# Patient Record
Sex: FEMALE | Race: WHITE | NOT HISPANIC OR LATINO | Employment: OTHER | URBAN - METROPOLITAN AREA
[De-identification: names, ages, dates, MRNs, and addresses within clinical notes are randomized per-mention and may not be internally consistent; named-entity substitution may affect disease eponyms.]

---

## 2019-03-25 ENCOUNTER — HISTORICAL (OUTPATIENT)
Dept: ADMINISTRATIVE | Facility: HOSPITAL | Age: 74
End: 2019-03-25

## 2019-03-25 LAB
BUN SERPL-MCNC: 18 MG/DL (ref 10–20)
CALCIUM SERPL-MCNC: 9 MG/DL (ref 8–10.5)
CHLORIDE SERPL-SCNC: 104 MMOL/L (ref 100–108)
CO2 SERPL-SCNC: 28 MMOL/L (ref 21–35)
CREAT SERPL-MCNC: 0.64 MG/DL (ref 0.7–1.3)
CREAT/UREA NIT SERPL: 28
D DIMER PPP IA.FEU-MCNC: 0.35 MG/L
GLUCOSE SERPL-MCNC: 125 MG/DL (ref 75–116)
POTASSIUM SERPL-SCNC: 3.3 MMOL/L (ref 3.6–5.2)
SODIUM SERPL-SCNC: 143 MMOL/L (ref 135–145)

## 2020-04-06 ENCOUNTER — HISTORICAL (OUTPATIENT)
Dept: ADMINISTRATIVE | Facility: HOSPITAL | Age: 75
End: 2020-04-06

## 2020-04-09 ENCOUNTER — HISTORICAL (OUTPATIENT)
Dept: ADMINISTRATIVE | Facility: HOSPITAL | Age: 75
End: 2020-04-09

## 2022-03-09 ENCOUNTER — HISTORICAL (OUTPATIENT)
Dept: PREADMISSION TESTING | Facility: HOSPITAL | Age: 77
End: 2022-03-09

## 2022-03-09 ENCOUNTER — HISTORICAL (OUTPATIENT)
Dept: ADMINISTRATIVE | Facility: HOSPITAL | Age: 77
End: 2022-03-09

## 2022-03-09 LAB
ABS NEUT (OLG): 2.45 (ref 2.1–9.2)
ALBUMIN SERPL-MCNC: 4.4 G/DL (ref 3.4–4.8)
ALBUMIN/GLOB SERPL: 1.6 {RATIO} (ref 1.1–2)
ALP SERPL-CCNC: 52 U/L (ref 40–150)
ALT SERPL-CCNC: 18 U/L (ref 0–55)
AST SERPL-CCNC: 18 U/L (ref 5–34)
BASOPHILS # BLD AUTO: 0 10*3/UL (ref 0–0.2)
BASOPHILS NFR BLD AUTO: 1 %
BILIRUB SERPL-MCNC: 0.9 MG/DL
BILIRUBIN DIRECT+TOT PNL SERPL-MCNC: 0.4 (ref 0–0.5)
BILIRUBIN DIRECT+TOT PNL SERPL-MCNC: 0.5 (ref 0–0.8)
BUN SERPL-MCNC: 13.5 MG/DL (ref 9.8–20.1)
CALCIUM SERPL-MCNC: 9.7 MG/DL (ref 8.7–10.5)
CHLORIDE SERPL-SCNC: 107 MMOL/L (ref 98–107)
CO2 SERPL-SCNC: 28 MMOL/L (ref 23–31)
CREAT SERPL-MCNC: 0.63 MG/DL (ref 0.55–1.02)
EOSINOPHIL # BLD AUTO: 0.1 10*3/UL (ref 0–0.9)
EOSINOPHIL NFR BLD AUTO: 2 %
ERYTHROCYTE [DISTWIDTH] IN BLOOD BY AUTOMATED COUNT: 14.8 % (ref 11.5–17)
GLOBULIN SER-MCNC: 2.8 G/DL (ref 2.4–3.5)
GLUCOSE SERPL-MCNC: 115 MG/DL (ref 82–115)
HCT VFR BLD AUTO: 43.2 % (ref 37–47)
HEMOLYSIS INTERF INDEX SERPL-ACNC: 1
HGB BLD-MCNC: 13.9 G/DL (ref 12–16)
ICTERIC INTERF INDEX SERPL-ACNC: 1
LIPEMIC INTERF INDEX SERPL-ACNC: <0
LYMPHOCYTES # BLD AUTO: 1.6 10*3/UL (ref 0.6–4.6)
LYMPHOCYTES NFR BLD AUTO: 35 %
MANUAL DIFF? (OHS): NO
MCH RBC QN AUTO: 28.3 PG (ref 27–31)
MCHC RBC AUTO-ENTMCNC: 32.2 G/DL (ref 33–36)
MCV RBC AUTO: 88 FL (ref 80–94)
MONOCYTES # BLD AUTO: 0.4 10*3/UL (ref 0.1–1.3)
MONOCYTES NFR BLD AUTO: 8 %
NEUTROPHILS # BLD AUTO: 2.45 10*3/UL (ref 2.1–9.2)
NEUTROPHILS NFR BLD AUTO: 54 %
PLATELET # BLD AUTO: 255 10*3/UL (ref 130–400)
PMV BLD AUTO: 10 FL (ref 9.4–12.4)
POTASSIUM SERPL-SCNC: 4.5 MMOL/L (ref 3.5–5.1)
PROT SERPL-MCNC: 7.2 G/DL (ref 5.8–7.6)
RBC # BLD AUTO: 4.91 10*6/UL (ref 4.2–5.4)
SODIUM SERPL-SCNC: 145 MMOL/L (ref 136–145)
WBC # SPEC AUTO: 4.5 10*3/UL (ref 4.5–11.5)

## 2022-03-21 ENCOUNTER — HISTORICAL (OUTPATIENT)
Dept: SURGERY | Facility: HOSPITAL | Age: 77
End: 2022-03-21

## 2022-03-21 LAB
CBG: 106 (ref 70–115)
CBG: 135 (ref 70–115)
CBG: 83 (ref 70–115)

## 2022-04-12 ENCOUNTER — HISTORICAL (OUTPATIENT)
Dept: RADIATION THERAPY | Facility: HOSPITAL | Age: 77
End: 2022-04-12

## 2022-05-01 PROBLEM — C50.412 MALIGNANT NEOPLASM OF UPPER-OUTER QUADRANT OF LEFT BREAST IN FEMALE, ESTROGEN RECEPTOR POSITIVE: Status: ACTIVE | Noted: 2022-05-01

## 2022-05-01 PROBLEM — Z17.0 MALIGNANT NEOPLASM OF UPPER-OUTER QUADRANT OF LEFT BREAST IN FEMALE, ESTROGEN RECEPTOR POSITIVE: Status: ACTIVE | Noted: 2022-05-01

## 2022-05-01 NOTE — PROGRESS NOTES
Subjective:       Patient ID: Nadja Monae is a 76 y.o. female.    Referring physician: Dr. Brandon Samano  Reason for Referral: Breast Cancer    Left Breast Cancer Stage IA ([id number])--Diagnosed 22  Biopsy/pathology:  Left breast biopsy done 22--2:00 4CMFN core biopsies positive for invasive ductal carcinoma, mucinous type, intermediate grade, measuring at least 0.7cm, DCIS, cribriform pattern, intermediate grade, w/o necrosis, %, WV 87%, Her2 1+ by IHC, negative by FISH, Ki67 5.3%.  Surgery/pathology: Left breast lumpectomy with SLN biopsy done 3/21/22--invasive mucinous carcinoma, well differentiated, Grade 1, measuring 0.9cm, clear margins, 4 negative SLNs.   Imagin. Screening Oscar MMG bilateral done at [facility] 22--an asymmetry anterior UOQ of left breast, measures 1.3cm, needs additional imaging, BIRADS 0.  2. Diagnostic MMG/US done at [facility] 2/15/22--lobulated mass in left breast at 2:00 4CMFN, measures 0.9X0.5X1cm, suspicious by US, focal asymmetry in UOQ of left breast does persist on MMG, BIRADS 4, biopsy recommeded.     Treatment history:  Left breast lumpectomy and SLN biopsy 3/21/22.    Current treatment plan:  1. Decision regarding radiation  2. Femara X 5 years    Chief Complaint: NPO    HPI   77 yo female found on screening MMG to have a focal asymmetry left breast UOQ. Diagnostic MMG/US showed asymmetry, lobulated mass at 2:00 left breast 4CMFN measuring 1cm, suspicious. Patient underwent biopsy and pathology showed IDCA, mucinous type, intermediate grade, measuring at least 0.7cm, strongly ER and WV positive and Her2 negative. Patient was referred to Dr. Brandon Samano. 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 She has been referred to me for recommendations for adjuvant treatment. Patient reports menarche age 12, OCPs for a few years, first pregnancy age 30, MARY and BSO in 30s,  +HRT 10+ years. FH first cousin with breast cancer in 60s, father with lung cancer. Patient presents for initial clinic visit with her . She is doing well since her surgery. She reports having a seroma after surgery that had to be drained but now is doing good. She denies any other problems. I discussed her low-risk breast cancer and excellent prognosis. She is meeting with radiation oncology next week.        Past Medical History:   Diagnosis Date    Anxiety       Review of patient's allergies indicates:  No Known Allergies   Current Outpatient Medications on File Prior to Visit   Medication Sig Dispense Refill    allopurinoL (ZYLOPRIM) 300 MG tablet Take 300 mg by mouth once daily.      EScitalopram oxalate (LEXAPRO) 20 MG tablet Take 20 mg by mouth once daily.      glimepiride (AMARYL) 4 MG tablet Take 4 mg by mouth once daily.      isosorbide mononitrate (IMDUR) 30 MG 24 hr tablet Take 30 mg by mouth once daily.      metFORMIN (GLUCOPHAGE) 1000 MG tablet Take 1,000 mg by mouth 2 (two) times daily.      pioglitazone (ACTOS) 30 MG tablet Take 30 mg by mouth once daily.      rosuvastatin (CRESTOR) 20 MG tablet Take 20 mg by mouth Daily.      VICTOZA 3-LINDSEY 0.6 mg/0.1 mL (18 mg/3 mL) PnIj pen Inject 3 mLs into the skin once daily.       No current facility-administered medications on file prior to visit.      Review of Systems   Constitutional: Negative for appetite change, fatigue, fever and unexpected weight change.   HENT: Negative for mouth sores.    Eyes: Negative.    Respiratory: Negative for cough and shortness of breath.    Cardiovascular: Negative for chest pain and leg swelling.   Gastrointestinal: Negative for abdominal distention, abdominal pain, constipation, diarrhea, nausea, vomiting and reflux.   Genitourinary: Negative for difficulty urinating, dysuria and hematuria.   Musculoskeletal: Negative for arthralgias and back pain.   Integumentary:  Negative for rash.        Seroma left  breast s/p lumpectomy, improved   Neurological: Negative for weakness and headaches.   Hematological: Negative for adenopathy.   Psychiatric/Behavioral: Negative for sleep disturbance. The patient is not nervous/anxious.               Physical Exam  Constitutional:       Appearance: Normal appearance.   HENT:      Head: Normocephalic.      Nose: Nose normal.      Mouth/Throat:      Mouth: Mucous membranes are moist.   Eyes:      Extraocular Movements: Extraocular movements intact.      Conjunctiva/sclera: Conjunctivae normal.   Cardiovascular:      Rate and Rhythm: Normal rate and regular rhythm.   Pulmonary:      Effort: Pulmonary effort is normal.      Breath sounds: Normal breath sounds.   Chest:   Breasts:      Right: Normal.        Comments: Left breast with incision outer quadrant healed well, left axillary incision healed, no palpable mass in either breast and no adenopathy  Abdominal:      General: Bowel sounds are normal. There is no distension.      Palpations: Abdomen is soft.      Tenderness: There is no abdominal tenderness.   Musculoskeletal:         General: Normal range of motion.   Skin:     General: Skin is warm.   Neurological:      General: No focal deficit present.      Mental Status: She is alert and oriented to person, place, and time.   Psychiatric:         Mood and Affect: Mood normal.         Judgment: Judgment normal.       ECOG SCORE    1 - Restricted in strenuous activity-ambulatory and able to carry out work of a light nature       No results for input(s): CBC in the last 72 hours.    Invalid input(s):  CMP,  BMP       Assessment:       Problem List Items Addressed This Visit        Oncology    Malignant neoplasm of upper-outer quadrant of left breast in female, estrogen receptor positive    Relevant Medications    letrozole (FEMARA) 2.5 mg Tab    Other Relevant Orders    Ambulatory referral/consult to [facility]    CBC Auto Differential    Comprehensive Metabolic Panel              Plan:    Patient with Stage IA breast cancer ([id number]) s/p lumpectomy done 3/21/22. Tumor measured 9mm, Grade 1, invasive mucinous carcinoma, margins clear with 4 negative SLNs, strongly ER and CO positive and Her2 negative, with low Ki67.  Due to favorable histology, per NCCN, no chemotherapy recommended.  I have recommended treatment with AI X 5 years.    Patient will meet with radiation oncology next week to make decision regarding radiation.  If radiation is planned, will start Femara after radiation.  If no radiation, start Femara now.    Femara prescription sent to pharmacy.  Schedule patient education.   Most common side effects discussed and patient given information from [email or web address] on Femara.    Obtain most recent bone density from [facility].    RTC 6 weeks for follow-up.     Will need follow-up MMG, will need to be sure this has been ordered by Dr. Samano after next visit.    I discussed patient's new diagnosis of breast cancer. Total time spent with patient discussing pathology, treatment plan and review of current NCCN guidelines >60minutes.   I discussed excellent prognosis given early stage and favorable tumor characteristics.       All questions answered at this time.    Dee Moss MD

## 2022-05-02 ENCOUNTER — TELEPHONE (OUTPATIENT)
Dept: HEMATOLOGY/ONCOLOGY | Facility: CLINIC | Age: 77
End: 2022-05-02
Payer: MEDICARE

## 2022-05-03 ENCOUNTER — OFFICE VISIT (OUTPATIENT)
Dept: HEMATOLOGY/ONCOLOGY | Facility: CLINIC | Age: 77
End: 2022-05-03
Payer: MEDICARE

## 2022-05-03 VITALS
TEMPERATURE: 98 F | SYSTOLIC BLOOD PRESSURE: 142 MMHG | RESPIRATION RATE: 14 BRPM | DIASTOLIC BLOOD PRESSURE: 82 MMHG | WEIGHT: 143.31 LBS | OXYGEN SATURATION: 99 % | HEART RATE: 75 BPM

## 2022-05-03 DIAGNOSIS — C50.412 MALIGNANT NEOPLASM OF UPPER-OUTER QUADRANT OF LEFT BREAST IN FEMALE, ESTROGEN RECEPTOR POSITIVE: ICD-10-CM

## 2022-05-03 DIAGNOSIS — Z17.0 MALIGNANT NEOPLASM OF UPPER-OUTER QUADRANT OF LEFT BREAST IN FEMALE, ESTROGEN RECEPTOR POSITIVE: ICD-10-CM

## 2022-05-03 PROCEDURE — 99999 PR PBB SHADOW E&M-EST. PATIENT-LVL III: ICD-10-PCS | Mod: PBBFAC,,, | Performed by: INTERNAL MEDICINE

## 2022-05-03 PROCEDURE — 99205 PR OFFICE/OUTPT VISIT, NEW, LEVL V, 60-74 MIN: ICD-10-PCS | Mod: S$GLB,,, | Performed by: INTERNAL MEDICINE

## 2022-05-03 PROCEDURE — 3079F PR MOST RECENT DIASTOLIC BLOOD PRESSURE 80-89 MM HG: ICD-10-PCS | Mod: CPTII,S$GLB,, | Performed by: INTERNAL MEDICINE

## 2022-05-03 PROCEDURE — 3077F PR MOST RECENT SYSTOLIC BLOOD PRESSURE >= 140 MM HG: ICD-10-PCS | Mod: CPTII,S$GLB,, | Performed by: INTERNAL MEDICINE

## 2022-05-03 PROCEDURE — 99205 OFFICE O/P NEW HI 60 MIN: CPT | Mod: S$GLB,,, | Performed by: INTERNAL MEDICINE

## 2022-05-03 PROCEDURE — 99999 PR PBB SHADOW E&M-EST. PATIENT-LVL III: CPT | Mod: PBBFAC,,, | Performed by: INTERNAL MEDICINE

## 2022-05-03 PROCEDURE — 3077F SYST BP >= 140 MM HG: CPT | Mod: CPTII,S$GLB,, | Performed by: INTERNAL MEDICINE

## 2022-05-03 PROCEDURE — 3079F DIAST BP 80-89 MM HG: CPT | Mod: CPTII,S$GLB,, | Performed by: INTERNAL MEDICINE

## 2022-05-03 RX ORDER — ALLOPURINOL 300 MG/1
300 TABLET ORAL DAILY
COMMUNITY
Start: 2022-04-25

## 2022-05-03 RX ORDER — LIRAGLUTIDE 6 MG/ML
3 INJECTION SUBCUTANEOUS DAILY
COMMUNITY
Start: 2022-04-26 | End: 2022-11-09

## 2022-05-03 RX ORDER — GLIMEPIRIDE 4 MG/1
4 TABLET ORAL DAILY
COMMUNITY
Start: 2022-04-25

## 2022-05-03 RX ORDER — ISOSORBIDE MONONITRATE 30 MG/1
15 TABLET, EXTENDED RELEASE ORAL DAILY
COMMUNITY
Start: 2022-03-14

## 2022-05-03 RX ORDER — ESCITALOPRAM OXALATE 20 MG/1
20 TABLET ORAL DAILY
COMMUNITY
Start: 2022-03-14 | End: 2023-11-27

## 2022-05-03 RX ORDER — ROSUVASTATIN CALCIUM 20 MG/1
5 TABLET, COATED ORAL DAILY
COMMUNITY
Start: 2022-04-25

## 2022-05-03 RX ORDER — LETROZOLE 2.5 MG/1
2.5 TABLET, FILM COATED ORAL DAILY
Qty: 30 TABLET | Refills: 2 | Status: SHIPPED | OUTPATIENT
Start: 2022-05-03 | End: 2022-08-01

## 2022-05-03 RX ORDER — METFORMIN HYDROCHLORIDE 1000 MG/1
1000 TABLET ORAL 2 TIMES DAILY
COMMUNITY
Start: 2022-04-23

## 2022-05-03 RX ORDER — PIOGLITAZONEHYDROCHLORIDE 30 MG/1
15 TABLET ORAL DAILY
COMMUNITY
Start: 2022-04-25

## 2022-05-03 NOTE — LETTER
May 3, 2022        Brandon Samano MD  [address]  [address]  [place] [address]             [facility]  [address], [address]  [place] [address]  Phone: [phone]   Patient: Nadja Monae   MR Number: [id number]   YOB: 1945   Date of Visit: 5/3/2022       Dear Dr. Samano:    Thank you for referring Nadja Monae to me for evaluation. Below are the relevant portions of my assessment and plan of care.    Problem List Items Addressed This Visit        Oncology    Malignant neoplasm of upper-outer quadrant of left breast in female, estrogen receptor positive    Relevant Medications    letrozole (FEMARA) 2.5 mg Tab    Other Relevant Orders    Ambulatory referral/consult to [facility]    CBC Auto Differential    Comprehensive Metabolic Panel           Patient with Stage IA breast cancer ([id number]) s/p lumpectomy done 3/21/22. Tumor measured 9mm, Grade 1, invasive mucinous carcinoma, margins clear with 4 negative SLNs, strongly ER and MO positive and Her2 negative, with low Ki67.  Due to favorable histology, per NCCN, no chemotherapy recommended.  I have recommended treatment with AI X 5 years.    Patient will meet with radiation oncology next week to make decision regarding radiation.  If radiation is planned, will start Femara after radiation.  If no radiation, start Femara now.    Femara prescription sent to pharmacy.  Schedule patient education.   Most common side effects discussed and patient given information from [facility] on Femara.    Obtain most recent bone density from [facility].    RTC 6 weeks for follow-up.     Will need follow-up MMG, will need to be sure this has been ordered by Dr. Samano after next visit.    I discussed patient's new diagnosis of breast cancer. Total time spent with patient discussing pathology, treatment plan and review of current NCCN guidelines >60minutes.   I discussed excellent prognosis given early stage  and favorable tumor characteristics.       All questions answered at this time.    Dee Moss MD                    If you have questions, please do not hesitate to call me. I look forward to following Nadja along with you.    Sincerely,      Dee Moss MD           CC  No Recipients

## 2022-05-09 PROCEDURE — 77290 THER RAD SIMULAJ FIELD CPLX: CPT | Performed by: RADIOLOGY

## 2022-05-09 PROCEDURE — 77334 RADIATION TREATMENT AID(S): CPT | Performed by: RADIOLOGY

## 2022-05-09 NOTE — PATIENT INSTRUCTIONS
Hormone Therapy for Breast Cancer  Key Points  The hormones estrogen and progesterone can stimulate the growth of some breast cancers. Hormone therapy is used to stop or slow the growth of these tumors.  Hormone therapy is used to treat both early and advanced breast cancer, and to prevent breast cancer in women who are at high risk of developing the disease.  Certain medications, especially antidepressants, may reduce the potency of the hormone therapy drug tamoxifen. People who are taking antidepressants should discuss this issue with their doctor.   What are hormones?  Hormones are substances that function as chemical messengers in the body. They affect the actions of cells and tissues at various locations in the body, often reaching their targets through the bloodstream.  The hormones estrogen and progesterone are produced by the ovaries in premenopausal women and by some other tissues, including fat and skin, in both premenopausal and postmenopausal women. Estrogen promotes the development and maintenance of female sex characteristics and the growth of long bones. Progesterone plays a role in the menstrual cycle and pregnancy.  Estrogen and progesterone can also promote the growth of some breast cancers, which are called hormone-sensitive (or hormone-dependent) breast cancers.  How do hormones stimulate the growth of breast cancer?  Hormone-sensitive breast cancer cells contain proteins known as hormone receptors that become activated when hormones bind to them. The activated receptors cause changes in the expression of specific genes, which can lead to the stimulation of cell growth.  To determine whether breast cancer cells contain hormone receptors, doctors test samples of tumor tissue that have been removed by surgery. If the tumor cells contain estrogen receptors, the cancer is called estrogen receptor-positive (ER-positive), estrogen-sensitive, or estrogen-responsive. Similarly, if the tumor cells contain  progesterone receptors, the cancer is called progesterone receptor-positive (NC- or PgR-positive). Approximately 70 percent of breast cancers are ER-positive. Most ER-positive breast cancers are also NC-positive (1).  Breast cancers that lack estrogen receptors are called estrogen receptor-negative (ER-negative). These tumors are estrogen-insensitive, meaning that they do not use estrogen to grow. Breast tumors that lack progesterone receptors are called progesterone receptor-negative (NC- or PgR-negative).  What is hormone therapy?  Hormone therapy (also called hormonal therapy, hormone treatment, or endocrine therapy) slows or stops the growth of hormone-sensitive tumors by blocking the body's ability to produce hormones or by interfering with hormone action. Tumors that are hormone-insensitive do not respond to hormone therapy.  Hormone therapy for breast cancer is not the same as menopausal hormone therapy or female hormone replacement therapy, in which hormones are given to reduce the symptoms of menopause.  What types of hormone therapy are used for breast cancer?  Several strategies have been developed to treat hormone-sensitive breast cancer, including the following:  Blocking ovarian function: Because the ovaries are the main source of estrogen in premenopausal women, estrogen levels in these women can be reduced by eliminating or suppressing ovarian function. Blocking ovarian function is called ovarian ablation.  Ovarian ablation can be done surgically in an operation to remove the ovaries (called oophorectomy) or by treatment with radiation. This type of ovarian ablation is usually permanent.  Alternatively, ovarian function can be suppressed temporarily by treatment with drugs called gonadotropin-releasing hormone (GnRH) agonists, which are also known as luteinizing hormone-releasing hormone (LH-RH) agonists. These medicines interfere with signals from the pituitary gland that stimulate the ovaries to  produce estrogen.  Examples of ovarian suppression drugs that have been approved by the U[place] Food and Drug Administration (FDA) are goserelin (Zoladex®) and leuprolide (Lupron®).  Blocking estrogen production: Drugs called aromatase inhibitors can be used to block the activity of an enzyme called aromatase, which the body uses to make estrogen in the ovaries and in other tissues. Aromatase inhibitors are used primarily in postmenopausal women because the ovaries in premenopausal women produce too much aromatase for the inhibitors to block effectively. However, these drugs can be used in premenopausal women if they are given together with a drug that suppresses ovarian function.  Examples of aromatase inhibitors approved by the FDA are anastrozole (Arimidex®) and letrozole (Femara®), both of which temporarily inactivate aromatase, and exemestane (Aromasin®), which permanently inactivates the enzyme.  Blocking estrogen's effects: Several types of drugs interfere with estrogen's ability to stimulate the growth of breast cancer cells:  Selective estrogen receptor modulators (SERMs) bind to estrogen receptors, preventing estrogen from binding. Examples of SERMs approved by the FDA are tamoxifen (Nolvadex®), raloxifene (Evista®), and toremifene (Fareston®). Tamoxifen has been used for more than 30 years to treat hormone receptor-positive breast cancer.    Because SERMs bind to estrogen receptors, they can potentially not only block estrogen activity (i.e., serve as estrogen antagonists) but also mimic estrogen effects (i.e., serve as estrogen agonists). Most SERMs behave as estrogen antagonists in some tissues and as estrogen agonists in other tissues. For example, tamoxifen blocks the effects of estrogen in breast tissue but acts like estrogen in the uterus and bone.  Other antiestrogen drugs, such as fulvestrant (Faslodex®), work in a somewhat different way to block estrogen's effects. Like SERMs, fulvestrant attaches  to the estrogen receptor and functions as an estrogen antagonist. However, unlike SERMs, fulvestrant has no estrogen agonist effects. It is a pure antiestrogen. In addition, when fulvestrant binds to the estrogen receptor, the receptor is targeted for destruction.  How is hormone therapy used to treat breast cancer?  There are three main ways that hormone therapy is used to treat hormone-sensitive breast cancer:  Adjuvant therapy for early-stage breast cancer: Research has shown that women treated for early-stage ER-positive breast cancer benefit from receiving at least 5 years of adjuvant hormone therapy (2). Adjuvant therapy is treatment given after the main treatment (surgery, in the case of early-stage breast cancer) to increase the likelihood of a cure.  Adjuvant therapy may include radiation therapy and some combination of chemotherapy, hormone therapy, and targeted therapy. Tamoxifen has been approved by the FDA for adjuvant hormone treatment of premenopausal and postmenopausal women (and men) with ER-positive early-stage breast cancer, and anastrozole and letrozole have been approved for this use in postmenopausal women.  A third aromatase inhibitor, exemestane, is approved for adjuvant treatment of early-stage breast cancer in postmenopausal women who have received tamoxifen previously.  Until recently, most women who received adjuvant hormone therapy to reduce the chance of a breast cancer recurrence took tamoxifen every day for 5 years. However, with the Taoism of newer hormone therapies, some of which have been compared with tamoxifen in clinical trials, additional approaches to hormone therapy have become common (3-5). For example, some women may take an aromatase inhibitor every day for 5 years, instead of tamoxifen. Other women may receive additional treatment with an aromatase inhibitor after 5 years of tamoxifen. Finally, some women may switch to an aromatase inhibitor after 2 or 3 years of  tamoxifen, for a total of 5 or more years of hormone therapy.  Decisions about the type and duration of adjuvant hormone therapy must be made on an individual basis. This complicated decision-making process is best carried out by talking with an oncologist, a doctor who specializes in cancer treatment.  Treatment of metastatic breast cancer: Several types of hormone therapy are approved to treat hormone-sensitive breast cancer that is metastatic (has spread to other parts of the body).  Studies have shown that tamoxifen is effective in treating women and men with metastatic breast cancer (6). Toremifene is also approved for this use. The antiestrogen fulvestrant can be used in postmenopausal women with metastatic ER-positive breast cancer after treatment with other antiestrogens (7).  The aromatase inhibitors anastrozole and letrozole can be given to postmenopausal women as initial therapy for metastatic hormone-sensitive breast cancer (8, 9). These two drugs, as well as the aromatase inhibitor exemestane, can also be used to treat postmenopausal women with advanced breast cancer whose disease has worsened after treatment with tamoxifen (10).  Neoadjuvant treatment of breast cancer: The use of hormone therapy to treat breast cancer before surgery (neoadjuvant therapy) has been studied in clinical trials (11). The goal of neoadjuvant therapy is to reduce the size of a breast tumor to allow breast-conserving surgery. Data from randomized controlled trials have shown that neoadjuvant hormone therapies--in particular, aromatase inhibitors--can be effective in reducing the size of breast tumors in postmenopausal women. The results in premenopausal women are less clear because only a few small trials involving relatively few premenopausal women have been conducted thus far.  No hormone therapy has yet been approved by the FDA for the neoadjuvant treatment of breast cancer.  Can hormone therapy be used to prevent breast  cancer?  Yes. Most early breast cancers are ER-positive, and clinical trials have studied whether hormone therapy can be used to prevent breast cancer in women who are at increased risk of getting the disease.  A large [employer]-sponsored randomized clinical trial called the Breast Cancer Prevention Trial found that tamoxifen, taken for 5 years, reduced the risk of developing invasive breast cancer by about 50 percent in postmenopausal women who were at increased risk of getting the disease (12). A subsequent large randomized trial, the Study of Tamoxifen and Raloxifene, which was also sponsored by [facility], found that 5 years of raloxifene reduces breast cancer risk in such women by about 38 percent (13).  As a result of these trials, both tamoxifen and raloxifene have been approved by the FDA to reduce the risk of developing breast cancer in women at high risk of the disease. Tamoxifen is approved for use regardless of menopausal status. Raloxifene is approved for use only in postmenopausal women.  The aromatase inhibitor exemestane has also been found to reduce the risk of breast cancer in postmenopausal women at increased risk of the disease. After 3 years of follow-up in another randomized trial, women who took exemestane were 65 percent less likely than those who took a placebo to develop breast cancer (14). Longer follow-up studies will be necessary to determine whether the risk reduction with exemestane remains high over time, as well as to understand any risks of exemestane treatment. Although exemestane has been approved by the FDA for treatment of women with ER-positive breast cancer, it has not been approved for breast cancer prevention.  What are the side effects of hormone therapy?  The side effects of hormone therapy depend largely on the specific drug or the type of treatment (5). The benefits and risks of taking hormone therapy should be carefully weighed for each woman.  Hot flashes, night sweats, and vaginal  dryness are common side effects of hormone therapy. Hormone therapy also disrupts the menstrual cycle in premenopausal women.  Less common but serious side effects of hormone therapy drugs are listed below.  Tamoxifen  Risk of blood clots, especially in the lungs and legs (12)  Stroke (15)  Cataracts (16)  Endometrial and uterine cancers (15, 17)  Bone loss in premenopausal women  Mood swings, depression, and loss of libido  In men: headaches, nausea, vomiting, skin rash, impotence, and decreased sexual interest  Raloxifene  Risk of blood clots, especially in the lungs and legs (12)  Stroke in certain subgroups (15)  Ovarian suppression  Bone loss  Mood swings, depression, and loss of libido  Aromatase inhibitors  Risk of heart attack, angina, heart failure, and hypercholesterolemia (18)  Bone loss  Joint pain (19-22)  Mood swings and depression  Fulvestrant  Gastrointestinal symptoms (23)  Loss of strength (23)  Pain  A common switching strategy, in which patients take tamoxifen for 2 or 3 years, followed by an aromatase inhibitor for 2 or 3 years, may yield the best balance of benefits and harms of these two types of hormone therapy (15).  Can other drugs interfere with hormone therapy?  Certain drugs, including several commonly prescribed antidepressants (those in the category called selective serotonin reuptake inhibitors, or SSRIs), inhibit an enzyme called CYP2D6. This enzyme plays a critical role in the use of tamoxifen by the body because it metabolizes, or breaks down, tamoxifen into molecules, or metabolites, that are much more active than tamoxifen itself.  The possibility that SSRIs might, by inhibiting CYP2D6, slow the metabolism of tamoxifen and reduce its potency is a concern given that as many as one-fourth of breast cancer patients experience clinical depression and may be treated with SSRIs. In addition, SSRIs are sometimes used to treat hot flashes caused by hormone therapy.  Researchers have  "found that women taking certain SSRIs together with tamoxifen have decreased blood levels of active tamoxifen metabolites. Because of this, many experts suggest that patients who are taking antidepressants along with tamoxifen should discuss treatment options with their doctors. For example, doctors may recommend switching from an SSRI that is a potent inhibitor of CYP2D6 (such as paroxetine) to one that is a weaker inhibitor (such as sertraline) or that has no inhibitory activity (such as venlafaxine or citalopram), or they may suggest that their postmenopausal patients take an aromatase inhibitor instead of tamoxifen.  Other medications that inhibit CYP2D6 include the following:  Quinidine, which is used to treat abnormal heart rhythms  Diphenhydramine, which is an antihistamine  Cimetidine, which is used to reduce stomach acid  People who are prescribed tamoxifen should discuss the use of all other medications with their doctors.  Where can someone find more information about drugs used in hormone therapy for breast cancer?  NCI's Drug Information Summaries provide consumer-friendly information about certain drugs that are approved by the FDA to treat cancer or conditions related to cancer. For each drug, topics covered include background information, research results, possible side effects, FDA approval information, and ongoing clinical trials. The Drug Information Summaries include information about drugs that have been approved for breast cancer.    Letrozole     (LET janel zole)  Trade Name: Femara®  Femara is the trade name for the generic drug letrozole.  In some cases, health care professionals may use the trade name  Femara ® when referring to the generic drug name letrozole.  Drug Type:  Letrozole is a hormone therapy. Letrozole is classified as an aromatase inhibitor.  (For more detail, see "How Letrozole Works" section below).  What Letrozole Is Used For:  Letrozole is used to treat breast cancer in " postmenopausal women.  Note: If a drug has been approved for one use, physicians may elect to use this same drug for other problems if they believe it may be helpful.  How Letrozole Is Given:  Letrozole is a pill, taken by mouth.  You should take Letrozole at about the same time each day.  You may take Letrozole with or without food. If you miss a dose, do not take a double dose the next day.  You should not stop taking Letrozole without discussing with your physician.  The amount of Letrozole that you will receive depends on many factors, including your general health or other health problems, and the type of cancer or condition being treated.  Your doctor will determine your dose and how long you will be taking Letrozole.  Side Effects:  Important things to remember about the side effects of Letrozole:  Most people do not experience all of the side effects listed.  Side effects are often predictable in terms of their onset and duration.   Side effects are almost always reversible and will go away after treatment is complete.  There are many options to help minimize or prevent side effects.  There is no relationship between the presence or severity of side effects and the effectiveness of the medication.  The following side effects are common (occurring in greater than 30%) for patients taking Letrozole:  Hot flashes  High cholesterol  These side effects are less common side effects (occurring in about 10-29%) of patients receiving Letrozole:  Arthralgias (bone and joint pain)  Night sweats  Weight gain  Nausea  Not all side effects are listed above. Some that are rare (occurring in less than 10% of patients) are not listed here.  However, you should always inform your health care provider if you experience any unusual symptoms.  When to contact your doctor or health care provider:  The following symptoms require medical attention, but are not an emergency.  Contact your health care provider within 24 hours of  noticing any of the following:  Symptoms of recurrent period (vaginal bleeding/spotting)  Always inform your health care provider if you experience any unusual symptoms.  Precautions:  Before starting Letrozole treatment, make sure you tell your doctor about any other medications you are taking (including prescription, over-the-counter, vitamins, herbal remedies, etc.).    Inform your health care professional if you are pregnant or may be pregnant prior to starting this treatment. Pregnancy category D (Letrozole may be hazardous to the fetus.  Women who are pregnant or become pregnant must be advised of the potential hazard to the fetus).  Letrozole is indicated for post-menopausal women. Do not conceive a child (get pregnant) while taking Letrozole. Barrier methods of contraception, such as condoms, are recommended. Discuss with your doctor when you may safely become pregnant or conceive a child after therapy.  Do not breast feed while taking Letrozole.  Self-Care Tips:  If you are experiencing hot flashes, wearing light clothing, staying in a cool environment, and putting cool cloths on your head may reduce symptoms. Consult your health care provider if these worsen, or become intolerable.  Acetaminophen or ibuprofen may help relieve discomfort from generalized aches and pains.  However, be sure to talk with your doctor before taking it.  Letrozole causes little nausea.  But if you should experience nausea, take anti-nausea medications as prescribed by your doctor, and eat small frequent meals.  Sucking on lozenges and chewing gum may also help.   Avoid sun exposure.  Wear SPF 15 (or higher) sunblock and protective clothing.  In general, drinking alcoholic beverages should be kept to a minimum or avoided completely.  You should discuss this with your doctor.  Get plenty of rest.   Maintain good nutrition.  If you experience symptoms or side effects, be sure to discuss them with your health care team.  They can  prescribe medications and/or offer other suggestions that are effective in managing such problems.  Monitoring and Testing:  You will be monitored regularly by your doctor while you are taking letrozole, but no special tests or blood tests are required.  How Letrozole Works:  Hormones are chemical substances that are produced by glands in the body, which enter the bloodstream and cause effects in other tissues.  For example, the hormone testosterone made in the testicles and is responsible for male characteristics such as deepening voice and increased body hair.  The use of hormone therapy to treat cancer is based on the observation that receptors for specific hormones that are needed for cell growth are on the surface of some tumor cells.  Hormone therapies work by stopping the production of a certain hormone, blocking hormone receptors, or substituting chemically similar agents for the active hormone, which cannot be used by the tumor cell.  The different types of hormone therapies are categorized by their function and/or the type of hormone that is affected.  Letrozole is an aromatase inhibitor.  This means it blocks the enzyme aromatase (found in the body's muscle, skin, breast and fat), which is used to convert androgens (hormones produced by the adrenal glands) into estrogen. In the absence of estrogen, tumors dependent on this hormone for growth will shrink.  Note:  We strongly encourage you to talk with your health care professional about your specific medical condition and treatments. The information contained in this website is meant to be helpful and educational, but is not a substitute for medical advice.      Your Summary  Your personalized [employer] Survivorship Care Plan gives you information about the health risks you may face as a  result of cancer therapies. Your level of risk can vary based on the doses and duration of your treatment and the  combination of treatments received.  You should discuss  this plan with your oncology team to better understand your personal risks. These results can be  concerning, but not every survivor experiences every side effect, and some survivors do not experience any long-term  effects. Learning about these risks can help you develop a plan with your healthcare provider to monitor for or reduce your  risk for these side effects through screening and a healthy lifestyle.  The information in your plan is broken down by:  Cancer therapies you received and related risks  Future screening recommendations  Healthy living tips  Psychosocial issues you may face  You may see a particular side effect in more than one section because more than one therapy can cause the same longterm  effect.  The information in this plan is based on the available research and literature concerning cancer survivors. This area is  continually growing, and as new information becomes available, it will be added to the program. You may want to redo  your plan periodically.  You received the following treatments for Breast Cancer  Lumpectomy  Arjay Node Biopsy  Letrozole (Femara®)  Coordinating Your Care  As a survivor, it is important that you keep a journal or notebook of your care. Include your doctor's contact information, a  list of past and current medications, therapies received, laboratory and radiology studies. (Visit the [employer] section on  [employer] for forms you can use to organize this material). While some survivors continue to see an oncologist, many return  to a primary care provider or internist for routine care, many of whom are uncertain how to care for you. Developing the  [employer] Survivorship Care Plan can help you and your primary care provider understand what issues to look for, and  how to handle them.  If you are being followed only by a primary care practitioner, it is a good idea to maintain a relationship with an oncologist  or late effects clinic, should you need any guidance  "or referrals with regards to late effects. Call the cancer center where  you were treated to ask if they have a survivor's clinic, or find one by searching [facility]'s Survivorship Clinic List (though  this list is not exhaustive). A survivorship clinic will review the therapies you received, discuss your risks with you, and act  as a consultant to your primary care team. Your [employer] Survivorship Care Plan includes a "Healthcare Provider  Summary" (found on the right side of your results), which is an abbreviated summary of recommendations that you can give  to healthcare providers for reference.  1/13  What's In Your Care Plan  Your Summary  Follow Up Care For Breast Cancer  Surgery Side Effects  [place] Node Biopsy  Lumpectomy  Risks Related to Medications  Risk of Developing Osteoporosis  Side Effects While Taking Aromatase Inhibitors  Sexuality Concerns for Female Survivors  What We Do Not Know  Healthy Living After Cancer  General Cancer Screening for Women after Breast Cancer  Follow Up Care For Breast Cancer  After receiving treatment for breast cancer, it is important for you to adhere to your providers plan for follow-up care.  General recommendations for follow-up care include:  Survivors who have had breast-conserving surgery (lumpectomy) or single mastectomy should have a mammogram  annually. In addition, breast MRI may be considered for survivors at high risk for cancer in the other breast.  Those who have had double mastectomy generally do not need mammograms, but you should examine your chest  wall for swelling or rash, and report any changes to your oncologist.  If you have had breast-conserving surgery (lumpectomy) and radiation, you should wait 6-12 months after radiation  before beginning annual mammograms.  Survivors should be seen by their oncologist 1-4 times per year for 5 years, and then annually after 5 years.  If you are taking tamoxifen and still have your uterus, you should be seen " once a year by a gynecologist or womens  health provider for an exam. Report any vaginal bleeding to your provider immediately, as this can be a sign of  uterine cancer.  Survivors taking an aromatase inhibitor or who go into menopause due to treatment should have their bone health  evaluated by a Dexa scan at baseline and then periodically thereafter as both of these can lead to loss of bone  strength.  Your team may recommend you take supplemental calcium and vitamin D.  Routine lab testing, CT scans, or bone scans to look for cancer spread (otherwise known as metastases) are not  recommended. Research has shown that if a woman does develop metastatic disease, the type of treatment,  response to treatment, and overall survival are equivalent, regardless of if it is found before symptoms develop. In  other words, outcomes are similar for those who are treated for metastases found on routine scans (with no symptoms  present) and women who are not treated until those metastases cause symptoms. Therefore, providers do not  routinely screen patients for metastatic disease unless they have developed symptoms.  Research has shown that leading an active lifestyle and maintaining a healthy weight, with a body mass index (BMI)  of 20-25, may result in a lower risk of recurrence. Weight-bearing exercises, such as walking, yoga, and dancing, can  also help maintain bone strength. Talk with your healthcare team about resources to get started (or back to) a healthy  lifestyle!  Report any new, unusual, and/or persistent symptoms to your care team.  Surgery Side Effects  2/13  [place] Node Biopsy  Lymphedema is swelling of an extremity (leg/arm), groin, or head/neck caused by damage to the lymphatic drainage  system. [place] node biopsy decreases the risk of developing lymphedema compared to removing all lymph nodes, but it  does not eliminate the risk. You have a higher risk of lymphedema if you had radiation therapy to the  area where lymph  nodes were removed. It is important for you to know about lymphedema and what you can do to reduce your risk.  Signs of lymphedema include any of the following in the limb/area where the lymph nodes were removed:  Full, tight or heavy feeling.  Skin changes (reddened, warm, cool, dry, hard, stiff).  Aching/discomfort.  Less movement/flexibility in nearby joints.  You may also notice that you are having trouble fitting into clothes, like the sleeve of a jacket or pant leg, or feel your socks  are too tight. You also may notice jewelry feels tight, even though you have not gained any weight.  Lymphedema can occur right after treatment, weeks, months, or even years later. If you develop swelling or any of the  above signs, contact your care team. It is best to get treatment for lymphedema swelling as soon as you start having it.  Reducing the demand on your remaining lymph system can reduce your risk for lymphedema and/or control lymphedema.  Do this by:  Avoid infections, burns, cuts, excessive hot/cold, or injury to the part of the body at risk.  Avoid insect bites by using insect repellent.  Use lotion to prevent dry, chapped skin. Keep skin clean.  Use sunscreen with SPF 15 or higher and try to avoid the sun during the hottest time of day.  Avoid tight-fitting clothes and jewelry.  Whenever possible, have blood drawn, IVs placed, shots/vaccinations given, and blood pressure taken in the  unaffected arm/leg.  For more information on what you can do to reduce risk, refer to the article Understanding and Decreasing  Lymphedema Risk on the [employer] website.  If you want to start an exercise plan, talk with your care team and work with a physical and/or lymphedema therapist to  create a workout plan appropriate for you. Maintaining an optimal weight through a healthy diet and exercise can reduce  your risk of lymphedema. Gradually build up the amount and intensity of your exercise and monitor the  at-risk areas for any  changes or swelling.  In some cases, surgery to remove the lymph nodes can result in injury to the nerves in that area. Nerve damage can cause  pain, numbness, tingling, decreased sensation or strength in the area or limb. These symptoms could become worse over  time if scar tissue caused by radiation therapy develops. This can happen many years after the treatment. This type of pain  is called nerve pain and is treated with different medications than other types of pain. Speak with your care team if you are  having this type of pain.  ? Key Takeaways  Report any signs of swelling in the arm/hand on the side of your surgery.  You should be seen by a certified lymphedema therapist if you develop any signs of swelling.  Follow the precautions given to you to reduce your risk of developing or worsening lymphedema.  Lumpectomy  A lumpectomy, sometimes called breast-conserving surgery, is the removal of only the breast mass (lump) and a  surrounding area of normal tissue. After a lumpectomy, your breast may look different. Plastic surgery may be used to  correct this issue.  3/13  There is a risk of nerve damage during breast surgery. This can lead to pain in the chest wall and/or pain and tingling in the  arm/hand on the side of the surgery. This pain can worsen if scar tissue forms from the surgery or radiation therapy to the  area. Scar tissue can develop years after therapy. This type of pain is called neuropathic (nerve) pain and is often  described as burning or electric. It can also include numbness, tingling and decreased strength or sensation in the area.  Talk to your care team if you are experiencing pain.  You may also have lymph nodes removed during surgery. The removal of lymph nodes increases the risk of developing  lymphedema. Lymphedema is swelling of an extremity (underarm, arm, hand) caused by damage to the lymphatic drainage  system. Signs of lymphedema include swelling  (clothing or jewelry feels tight), feeling of fullness or tightness, aching pain,  and limited range of motion. Contact your care provider at the first sign of lymphedema so that treatment can be started.  Patients who have had a lymph node dissection can have difficulty moving the arm and/or shoulder. This can be temporary  but can also become permanent. A physical therapist can recommend exercises to promote shoulder mobility.  If lymph nodes are removed during lumpectomy, there is also risk for axillary web syndrome (OBI). This is also known as  cording. It happens when a fibrotic band or rope/cord-like texture develops under the skin. You can usually feel these  rope/cord-like areas under your skin. It can cause difficulty raising your arm above your shoulder or doing overhead  activities. Ask your provider for a referral for physical therapy to help you regain function.  ? Key Takeaways  Report any signs of swelling in the arm/hand on the side of your surgery. You should be seen by a certified  lymphedema therapist if you develop any signs of swelling.  You will have a mammogram 6-12 months after treatment is done and then annually.  Report any pain or changes in the breast or chest wall to your healthcare provider.  Ask to work with a physical therapist if you are having trouble moving your shoulder or arm.  Risks Related to Medications  Risk of Developing Osteoporosis  Osteoporosis is a decrease in bone density (thinning of the bones) that can lead to fractures (broken bones). While this  information is shown under the medication or surgery tab, the risk can be related to a few things. The risk is increased for:  People who take corticosteroids for more than 2 months (dexamethasone or prednisone, more than 5mg per day).  People who receive radiation to weight-bearing bones (spine, hips, legs).  Women who develop premature (early) menopause or have their ovaries removed before menopause.  Women who take  aromatase inhibitors (anastrozole, letrozole, and exemestane).  Pre-menopausal women who take tamoxifen.  Men who receive hormone therapy for prostate cancer or have a testicle(s) removed (orchiectomy).  People who have had a gastrectomy (removal of the stomach).  People who have had bone marrow/stem cell transplants.  Several types of chemotherapy have been found to contribute to bone loss, including Cytoxan, cisplatin, carboplatin,  ifosfamide, doxorubicin, methotrexate, and possibly imatinib.  Other factors that increase the risk of osteoporosis include being over the age of 50, smoking, drinking too much  alcohol, not exercising, some chronic health conditions, and a family history of osteoporosis.  Lowering Your Risk  There are things you can do to lower your risk of a fracture and strengthen your bones. You can learn more from the  National Osteoporosis Foundation (www.nof.org).  Get 1000-1200mg a day of calcium.  4/13  It is best to get calcium in a balanced diet, including 4-8 servings of calcium-rich foods a day. Examples of  calcium-rich foods are low-fat milk, yogurt, cheese, green leafy vegetables, nuts, seeds, beans, legumes, and  calcium fortified foods and juices.  A dietician can provide more guidance in choosing calcium-rich foods. A good resource is www.myplate.gov.  If you cannot get the recommended amount of calcium in foods, take calcium supplements.  Your body does not absorb calcium supplements well (in food or supplements), so spread your foods or  supplements out over a few times a day, instead of all at once.  Most calcium supplements are better absorbed when taken with food. Calcium citrate is the one exception and  can be taken with or without food.  If you take Synthroid / levothyroxine (thyroid hormone), separate it from calcium doses by at least 4 hours.  Get 400-1000 IU of vitamin D-3 or D-2 daily.  Your healthcare provider may check blood levels of vitamin D with the 25-OH Vitamin D  "blood test. This level  combined with a look at how much vitamin D you get in your diet can help guide how much vitamin D you  should take.  Do not take more than 2000 IU of vitamin D3 a day, unless directed by your healthcare provider. If you take  calcium or a multivitamin, be sure to check if it also contains vitamin D and subtract this amount from what you  need to take.  You can take vitamin D supplements with or without food.  Weight-bearing exercise and strength training can improve your bone strength. It can also help with other symptoms  such as fatigue and can reduce your risk of heart disease and diabetes.  Before you start an exercise program, check first with your care team to determine if it is safe.  Start with what is comfortable for you and work up to the following recommendations:  Get 30-40 minutes of weight-bearing exercise 3 times a week. Weight-bearing exercises are those in which  your feet or legs bear your bodys weight while the bones and muscles work against gravity. Examples include  walking, jogging, Zaheer Chi, yoga, and dancing.  Strength training 2-3 times per week, with a day of rest in between each session. Complete 8-12 repetitions of  the exercises below and repeat them two times (2 sets). Choose a weight where the tenth repetition is hard for  you to complete and you cant complete an 11 time. Now remove 1-5 pounds from that "maximum weight,"  and use that as your training weight. When you can easily do 12 repetitions, try to increase the weight by 3-  5lbs.  Exercises include leg extensions, calf raises, leg curls, chest press, latissimus pulldown, overhead press,  row machine, and curl-ups.  Your provider can refer you to a physical therapist or a physical medicine and rehabilitation physician for more  guidance regarding bone-strengthening exercises.  Decrease or stop caffeine intake and limit alcohol intake, as these can weaken bones.  Quit smoking. Talk with your provider about " how to get started. Learn more on [employer].  Your provider may order a DEXA scan, which is a test used to measure the thickness (density) of your bones.  ? Key Takeaways  Avoid smoking, caffeine, and excessive alcohol intake.  Perform weight-bearing and strength training exercises 2-3 times per week.  Calcium intake of 1000-1200mg per day plus Vitamin D 800iu to 1000iu per day (ideally from food sources, and  supplements when your diet is not sufficient).  Consider screening with DEXA scan.  Side Effects While Taking Aromatase Inhibitors  Your [facility] Care Plan focuses on late effects of therapy or those that can occur months to years after completing  therapy. Given that hormone therapy regimens can last anywhere from 5 to 10 years, we felt it was important to include  some information about the side effects you may experience while taking these medicines.  th 5/13  Aromatase Inhibitors (AIs) commonly cause hot flashes and other symptoms of menopause, such as night sweats, vaginal  dryness and forgetfulness. Some helpful tips include:  Avoiding triggers such as warm rooms, spicy, caffeinated, or alcohol-containing foods or beverages can help reduce  hot flashes.  Drink plenty of fluids, wear breathable clothing in layers, and exercise regularly.  For some women, certain antidepressant medications can provide relief of hot flashes.  Vaginal dryness can make intercourse uncomfortable. Vaginal moisturizers (Replens) used several times a week  and lubricants during sexual activity can be helpful. If these do not help, talk with your provider as you may be able to  use a low-dose vaginal estrogen product.  About 60% of women taking AIs experience aching or pain in their muscles, joints or bones, also known as arthralgias. In  some cases, this side effect is troubling enough for them to stop therapy. This pain is most common in the hands, wrists,  and knees but can occur in any joints. Some women experience pain that  comes and goes, but for others it is constant.  Some women say the stiffness and pain are worse in the morning. The pain tends to get worse over time up until a year  and then levels off and can improve.  Brisk walking and other moderate-intensity exercises can help significantly reduce muscle and joint pains. That can be  difficult when you have pain. Seeing a rehabilitation physician, or physical or occupational therapist can help you get  started exercising or to help you manage the joint pain with stretching, strengthening, and bracing.  Nonsteroidal anti-inflammatory medicines (NSAIDs, ibuprofen, naproxen) can be used to reduce swelling in the joints,  reducing discomfort. Other medications used to treat pain include acetaminophen and duloxetine (Cymbalta). Applying  heat or wearing a brace can be helpful. Acupuncture can also be helpful.  In some cases, switching to a different AI medicine can result in less discomfort. Most importantly, talk with your oncology  team if you are experiencing side effects. When side effects are well managed, you are more likely to stick with the  treatment regimen and ultimately have the benefits it can offer.  ? Key Takeaways  You may experience hot flashes and other symptoms of menopause, as well as, aching in your muscles, joints  or bones. Let your healthcare provider know if these symptoms become troublesome.  Discuss side effects with your oncology team, as many of these are manageable. Optimal therapy can last 5-10  years, so management of side effects is critical to helping you stay on therapy.  Sexuality Concerns for Female Survivors  Women of any age can have sexual concerns after cancer treatment. Do not hesitate to talk with your oncology team.  Sexuality concerns are common. For many women, cancer treatment can cause the sudden onset of menopause. This  sudden change in hormone levels in your body causes changes such as thinning and inflammation of the vaginal  "walls,  loss of tissue elasticity, decreased vaginal lubrication, and this vaginal dryness can cause painful intercourse. You may  experience hot flashes, mood swings, fatigue, and irritability. You may notice you have less desire for sex and difficulty  achieving orgasm.  Vaginal dryness causing painful intercourse is a common concern for survivors. Some tips for managing this issue include:  Use of vaginal moisturizer (Replens is the brand name) a few times a week.  Use lubricants during sexual activity (water or silicone-based). There are many brands and generic options available  at your local pharmacy.  Vaginal estrogen therapy (suppository, cream, or ring used in the vagina) can be helpful. If you had a hormonedependent  cancer, you should discuss current research on using estrogen-based therapy with your healthcare team.  Surgery and/or radiation therapy can result in scarring that can make intercourse uncomfortable. Open  communication about position changes and alternative methods of expressing affection with your partner can help  when resuming sexual activity after treatment.  6/13  If your sex life has been on hold during treatment and you are looking to rekindle your romance or start a new one, take  steps to feel more at ease. Have a "date" with your partner to build the romance. Talk about what feels good and what  doesnt.  Concerns about changes in your body, cancer recurrence, the stress and anxiety caused by cancer therapy, or changes in  your relationship with your partner can all affect how you feel about your sexuality. It is important to understand that sexual  activity cannot cause cancer to recur, nor can you spread cancer to another person through sexual activity. If you find that  your feelings are significantly impacting your sexuality, you should talk with your healthcare team about finding a therapist  experienced in helping cancer survivors.  Of utmost importance in addressing sexual " concerns is communication, both between partners and between survivors and  their healthcare team. Understand that these concerns are common, and communication is the first step to finding the right  solutions. Visit [facility] section on sexuality for more information.  ? Key Takeaways  Many anti-cancer medicines are associated with vaginal dryness, painful intercourse, reduced sexual desire,  and the ability to achieve orgasm. Many of these issues are caused by the sudden onset of menopause, which  can occur with cancer therapy. [facility] article on Vaginal Dryness and Painful [place] provides product  suggestions and tips.  In addition, you may experience other symptoms of menopause, such as hot flashes, mood swings, fatigue, and  irritability. Research has found that exercise, yoga, and acupuncture can help lessen menopausal symptoms.  Talk to your healthcare team about tips to manage these issues.  Open communication with your healthcare team and partner is essential for regaining your sexuality and  resolving issues. You may also consider talking with a therapist experienced in working with cancer survivors.  What We Do Not Know  There are many medications that we do not know the long-term effects they may cause. Most cancers are treated with a  combination of medicines. This can make it difficult to be certain which medication is the cause of a long-term effect.  Research is continuing and new information may become available. You should periodically talk to your healthcare team  about new information or look for new information on reliable internet sites. You can also create a new [employer] care plan  every few years to see if there is any new information included.  In addition, there have been many new therapies developed in recent years, including many of the biologic therapies,  monoclonal antibodies and targeted therapies. We may not know the long-term effects of these medications for many  years. As  a survivor, you should be an active participant in your healthcare and keep an ear out for new information.  ? Key Takeaways  Many cancer treatments today have not been available long enough to know what issues they may cause in  the years after treatment.  Always let your healthcare team know if you notice any new or worsening symptoms. Remember, you know  your body best.  Periodically look for new information about your treatment and talk to your healthcare team to see if they have  anything new to report.  Healthy Living After Cancer  Survivors often wonder what steps they can take to live healthier after cancer. There is no supplement or specific food you  can eat to assure good health, but there are things you can do to live healthier, prevent other diseases, and detect any  7/13  subsequent cancers early.  In addition to medical problems and screening, cancer survivors also sometimes have issues with insurance, employment,  relationships, sexual functioning, fertility, and emotional issues because of their treatment and we will discuss those in this  care plan.  No matter what, it is important to have a plan for who will provide your cancer-focused follow up care (an oncologist,  survivorship doctor or primary care doctor). You have taken the first step by developing a survivorship plan of care. If you  would like to find a survivorship doctor to review your care plan you can contact cancer centers in your area to see if they  have a survivor's clinic or search for a clinic on [facility]'s survivorship clinic list.  General Cancer Screening for Women after Breast Cancer  Cancer screening tests are designed to find cancer or pre-cancerous areas before there are any symptoms and, generally,  when treatments are most successful. (Learn more about screening tests) Various organizations have developed  guidelines for cancer screening for women. While these guidelines vary slightly between different organizations,  they cover  the same basic screening tests for breast, cervical and colorectal cancers, and are recommended to begin as early as the  late teens.  In addition, during routine health exams (at any age) your healthcare provider may also evaluate for cancers of the skin,  mouth and thyroid. Not all screening tests are right for everyone. Your personal and family cancer history, and/or the  presence of a known genetic predisposition, can affect which tests are right for you, and at what age you begin them.  Therefore, you should discuss these with your healthcare provider. Your care plan will also include a section on follow up  care for your type of cancer, and these recommendations override the general screening recommendations for that  particular type of cancer in the general population.  The American Cancer Society (ACS) recommends these screening guidelines for women:  Cervical Cancer Screening  All women should begin cervical cancer screening at age 25 using the following methods:  Women between the ages of 25 and 65 should have:  Primary HPV testing every 5 years. This test is not yet available at many centers/practices.  If this test is not available, you should be screened with co-testing, which is a combination of an HPV and Pap  test. This should be done every 5 years.  If HPV testing is not available, then a Pap test alone should be performed every three years.  Women over age 65 who have had regular cervical screenings that were normal should not be screened for cervical  cancer.  Women who have been diagnosed with cervical pre-cancer should continue to be screened until they meet one  of the following criteria over the previous 10 years:  Two negative, consecutive HPV tests.  Or 2 negative, consecutive co-tests.  Or 3 negative, consecutive pap tests in the last 3-5 years.  Women who have had their uterus and cervix removed in a hysterectomy and have no history of cervical cancer or  pre-cancer should not  be screened.  Women who have had the HPV vaccine should still follow the screening recommendations for their age group.  While the ACS does not recommend cervical cancer screening every year, women should still see their provider for a  well-woman checkup.  Some women, because of their history, may need to have a different screening schedule for cervical cancer. Please see  the ACS document, Cervical Cancer Screening Guidelines for more information.  Endometrial (Uterine) Cancer Screening  The ACS recommends that at the time of menopause, all women should be informed about the risks and symptoms of  endometrial cancer. Women should report any unexpected bleeding or spotting to their doctors. Some women, because of  8/13  their history, may want to consider having a yearly endometrial biopsy. Please talk with your doctor about your past  medical and gynecologic history to determine whether you are at increased risk for endometrial cancer.  For More Information:  Visit the American Cancer Society to learn more about general cancer screening recommendations.  Colon and Rectal Cancer Screening  Most men and women over the age of 45-50 should undergo routine screening for colon and rectal cancer, up until age 75.  The American Cancer Society suggests starting screening at age 45, whereas the [place] Preventive Services Task  Force suggests starting at age 50. Insurance may not cover screening before the age of 50 so you should talk with your  provider and insurance company before screening. Testing may be appropriate for younger people with a high-risk  personal or family health history.  Options for colon cancer screening can be divided into those that screen for both cancer and polyps, and those that just  screen for cancer. Tests that screen for cancer and polyps include flexible sigmoidoscopy, colonoscopy, double-contrast  barium enema, or CT colonography (virtual colonoscopy). Tests that screen mainly for  cancer include stool testing for  blood, or stool DNA testing. Learn more about colorectal cancer screening options.  The preferred screening recommended by the American College of Gastroenterologists is a colonoscopy every 10 years.  The ACS recommends screening beginning at age 45 (unless you are considered high risk, see below), using one of the  following testing schedules:  Tests that find polyps and cancer:  (Preferred over those that find cancer alone. If any of these tests are positive, a colonoscopy should be done.)  Flexible sigmoidoscopy every 5 years, or  Colonoscopy every 10 years, or  Double-contrast barium enema every 5 years, or  CT colonography (virtual colonoscopy) every 5 years  Tests that primarily test for cancer:  Yearly fecal occult blood test (FOBT)*, or  Yearly fecal immunochemical test (FIT) *, or  Stool DNA test (sDNA), interval uncertain*  * The multiple stool take-home test should be used. One test done by the doctor in the office is not adequate for testing. A  colonoscopy should be done if the test is positive.  Talk with your doctor about your medical history, and what colorectal cancer screening test and schedule is best for you.  For more information on colorectal cancer from the ACS, read Colorectal Cancer: Early Detection.  Individuals at higher risk of colon cancer should have screening earlier and potentially more frequently. Individuals at  higher risk of colon and rectal cancer include:  Individuals with a family history of colon or rectal cancer in a relative who was diagnosed before the age of 60.  Individuals with a history of polyps.  Individuals with inflammatory bowel disease (Crohns disease or ulcerative colitis).  Individuals with a genetic predisposition to colon or rectal cancer, such as hereditary non-polyposis colon cancer  (HNPCC) syndrome or familial adenomatous polyposis (FAP) syndrome.  For more detailed information regarding screening for individuals at  higher risk for colon cancer, see the ACS guidelines  for screening for high-risk individuals.  Lung Cancer Screening  The American Cancer Society does not recommend tests to check for lung cancer in people who are at average risk.    However, they do have screening guidelines for those who are at high risk of lung cancer due to cigarette smoking.  Screening might be right for you if you meet the followin to 80 years of age and in fairly good health.  Have at least a 20 pack-year smoking history AND are either still smoking or have quit smoking within the last 15  years (A pack-year is the number of cigarette packs smoked each day multiplied by the number of years a person has  smoked. Someone who smoked a pack of cigarettes per day for 20 years has a 20 pack-year smoking history, as  does someone who smoked 2 packs a day for 10 years.)  Screening is done with a low-dose CT scan (LDCT) of the chest. If you fit the list above, you and your provider should talk  about whether to have screening.  Sun Exposure and Skin Cancer Risk  Skin cancer is the most commonly diagnosed type of cancer, and rates are on the rise. However, this is one cancer that in  most cases can be prevented or detected early. While you may hear that you need the sun to make vitamin D, in reality you  only need a few minutes a day to do this. Exposure to ultraviolet (UV) rays, either by natural sunlight or tanning beds, can  lead to skin cancer. In addition, UV rays lead to other forms of skin damage, including wrinkles, loss of skin elasticity, dark  patches (sometimes called age spots or liver spots), and pre-cancerous skin changes (such as dry, scaly, rough patches).  Although dark-skinned people are less likely to develop skin cancer, they can and do develop skin cancers, most often in  areas that are not exposed to the sun (on the soles of the feet, under nails, and genitals).  You can do a lot to protect yourself from damaging UV  rays and to detect skin cancer early. Start by practicing sun safety,  including using a broad-spectrum sunscreen (which protects against UVA & UVB rays) every day, avoiding peak sun times  (10 am-4 pm when the rays are strongest) and wearing protective clothing such as hats, sunglasses and long-sleeved  shirts.  Examine your skin regularly so you become familiar with any moles or birthmarks. If a mole has changed in any way, you  should have a healthcare provider examine the area. This includes a change in size, shape, or color, the development of  scaliness, bleeding, oozing, itchiness, or pain, or if you develop a sore that will not heal. If you have a lot of moles, it may  be helpful to make note of moles using photographs or a mole map. The American Academy of Dermatology has a  helpful guide to performing a skin exam.  Learn more about the types of skin cancer on OncoLink and the Skin Cancer Foundation.  Healthy Lifestyle  For some cancer survivors, the experience is the impetus to making healthy lifestyle changes. It may seem insignificant,  but these changes have been shown to reduce the risk of the cancer coming back or a new cancer developing. Below are  some tips on adopting a healthier lifestyle.  Do not use tobacco in any form. If you do, learn more on OncoLink and talk to your healthcare provider about taking  steps to quit.  Maintain a healthy weight. Many studies have found that excess weight plays an important role in cancer  development and recurrence. While maintaining a healthy weight is important in cancer prevention, it cannot easily  be  from the importance of physical activity and eating a healthy diet. Strive to incorporate all three pieces  of the puzzle: healthy weight, balanced diet, and regular exercise.  Talk to your healthcare team about what a healthy weight is for you, and take steps to reach and maintain that  weight.  Experts recommend at least 30 minutes of moderate to  vigorous activity per day, 5 days a week.  Eat healthy, including plenty of fruits and vegetables daily. Strive to have 2/3 of your plate be vegetables, fruits,  whole grains, and beans, while 1/3 or less should be an animal product. Choose fish and chicken and limit red  meat and processed meats.  Learn more about recommendations for diet, activity, and weight in the AICRs Guidelines for Survivors and the  ACS Eat Healthy and Get Active information on their website.  Learn more about the benefits of physical activity from [facility].  10/13  Limit how much alcohol you drink (if you drink at all).  Have regular check-ups by a healthcare professional.  Keep up-to-date on general health screening tests, including cholesterol, blood pressure, and glucose (blood sugar)  levels.  Learn about healthy screening tests for women and men from the [place] Department of Health and Human  Services.  Get an annual influenza vaccine (flu shot).  Get vaccinated with the pneumococcal vaccine, which prevents a type of pneumonia, and re-vaccinated as  determined by your healthcare team. Learn more about adult vaccinations from the [facility].  Dont forget dental and eye health!  The American Optometric Association recommends adults have their eyes examined every 2 years until age  60, then annually. People who wear glasses or corrective lenses or are at high risk for eye problems (i.e.  diabetics, family history of eye disease) should be seen more frequently.  The American Dental Association recommends adults see their dentist at least once a year.  Life After Cancer  While many patients are told about the long-term health effects of treatment before starting, they often don't recall or didn't  concern themselves with them at that time. This is understandable. When you're presented with treatment options to save  your life, thinking about what could happen ten or twenty years down the road isn't as much of a priority.  So, what  "to do now? You should learn what your risks are based on the treatment you received, learn what you can do to  reduce these risks, if possible, and learn what to watch for. You have taken the first step by developing a survivorship care  plan. Some survivors may also benefit from a visit to a survivorship clinic. These clinics review your treatment history and  develop recommendations for you and your primary care team based on your risks. Contact cancer centers in your area to  see if they have a survivor's clinic or search for a clinic on [facility]'s survivorship clinic list (though this list is not  exhaustive).  After active treatment is complete, you will move into follow-up mode. This usually means you will see your oncology team  less often. For many, this can be a very scary time. The weekly or monthly visits to the oncologist are reassuring. Someone  is checking on things and giving you the thumbs up. Survivors are often surprised by their emotions at this time. You may  anticipate jumping for christiana and throwing a party. Instead, you may find yourself crying in the parking lot after your last  treatment, feeling vulnerable in unexpected ways.  You may find it worrisome that you are no longer getting active treatment. You may miss the much-needed daily or weekly  support you got from the oncology team. You may experience fear, sadness, anger, isolation, and grief. This is normal! You  may also feel a sense of relief, gratitude, and be proud of yourself for getting through treatment - as you should!  The end of therapy is a time when friends and family may say, "Congratulations" and "You must be glad to be done. But  you may be feeling uncertain about this milestone. Friends, family, and even the oncology team can be surprised by the  complicated emotions you are experiencing. They may not realize that these emotions are common and even expected,  which may make you feel even more isolated. You aren't alone. " "These are common reactions and the information in your  care plan will help you transition to survivorship.  For starters, be assured that your oncology team is always there if you have any concerns. Plans for follow-up care have  been developed to follow each person in the best way. This plan varies for every type of cancer and may involve periodic  blood work, radiology scans and tests, and physical exams. You may only see the oncology team once or twice a year, but  they are always a phone call away.  Coping with your emotions after cancer  Any time of transition in your life can be stressful. Give yourself time to adjust to this new place. You may hear from others,  it must be nice to be getting back to normal. But as any cancer survivor will tell you, things have changed. So has your  definition of "normal". Many survivors say they look at life differently. They don't take things for granted and don't sweat the  small stuff.  11/13  A cancer diagnosis changes you as a person, something people around you may not fully understand. It may be helpful to  join a group of survivors, either formally (in a support group) or informally (gather with few folks you have met along the  way). Email and the Internet have created wonderful support for all sorts of concerns, and survivorship is no different. There  are also professional resources available to help.  [facility] provides online and telephone support groups.  The Cancer Support Community has a number of locations across the [place] that provide education, support, and  networking to cancer survivors and their families. Their Helpline can also help you find other survivorship resources.  Many cancer centers and cancer service organizations offer support groups for you to talk about concerns during and  after treatment. A gal program can match you with someone whos been in your shoes and can be a listening ear  and support person - or you can become a gal to " someone else. No one understands this time better than  someone who has been there, and this support can be very valuable. Peer support/Ken programs are offered  through many organizations including [employer], [facility], [facility], and  the Leukemia and Lymphoma Society.  For many, a cancer diagnosis and treatment is a traumatic experience. Recovering from this trauma can be complicated by  the late side effects of treatment. These side effects can impact your quality of life long after you have finished your cancer  treatment. Some survivors experience symptoms of post-traumatic stress disorder. This can include sleep problems,  nightmares, flashbacks, avoiding places associated with the experience (like your treatment center), feeling hopeless,  anger, and guilt (including survivor's guilt), loss of interest in things you usually enjoy, and substance use.  It is important to find ways to cope with the trauma of cancer and your experiences. If you are having symptoms of posttraumatic  stress, ask your care team for a referral for counseling. Your oncology social worker can help navigate your  insurance and find an experienced provider who can give you support and therapy. Your team can also refer you for  psychiatry support if medication treatment may be helpful in treating your post-traumatic symptoms.  However, out of trauma, comes the possibility of growth. You can use this experience to change and grow psychologically  and emotionally. Even out of the most challenging experiences in our lives, there is room for growth, improvement, and  change.  You may ask yourself, how has cancer changed me? Many patients have said, cancer is the best thing that ever  happened to me. Cancer taught them to live. To appreciate the simple things in life. To love. To give back. To be close to  others and show compassion. After treatment, you may try new things, decide its time to take that  trip you have always  been wanting to do, join a spiritual community, or even find a way to give back through volunteer work, advocacy, and  fundraising for a cancer service organization. All of these activities help you grow after cancer.  Some cancer patients have depression and anxiety during and after treatment. Many patients express scanxiety when  having their routine scans for follow-up. They worry about what the scans may show - has my cancer come back? This is  normal after going through such an experience. Its important to talk to your team if you are having these feelings. They do  usually improve with time and with your improved abilities to cope with worry and fear. However, sometimes they dont go  away or even get worse. This is a time for concern. Signs of persistent depression and anxiety include trouble sleeping  (insomnia) or sleeping too much (hypersomnia), hopelessness, inability or unwillingness to participate in normal activities  and even thoughts of self-harm or suicide. If you are having these symptoms, talk to your care team immediately.  Coping with practical concerns after treatment  It is essential to maintain your health insurance after you have completed treatment so you can get your follow-up care.  Because your health insurance is often related to your job, you may be wondering about returning to work or starting a new  position. This can also help to re-establish some normalcy in your life after treatment. However, returning to work can be a  challenge. For some, disability is the best option. Talk with your care team about your desires for work after treatment.  There are also some great resources for navigating work, disability, and insurance.  Learn about your rights and your employer's responsibilities under the law. You may want to talk with human  resources professionals at your job about things like returning to work, FMLA, and reasonable accommodations.  [facility]'s  section on legal, insurance, employment, and financial concerns provides great information about these  issues.  12/13  Powered By:  [employer] is designed for educational purposes only and is not engaged in rendering medical advice or professional services. The information  provided through [employer] should not be used for diagnosing or treating a health problem or a disease. It is not a substitute for professional  care. If you have or suspect you may have a health problem or have questions or concerns about the medication that you have been  prescribed, you should consult your health care provider.  Information Provided By: www.[employer].org  © 2022 Trustees of The [facility]  Cancer and Careers is a resource for all things work-related, from time of diagnosis well into survivorship.  The National Coalition for Cancer Survivorship and the American Cancer Society websites have financial and  insurance information for survivors.  The Cancer Legal Resource Center and T[facility] Cancer provide information on cancer-related legal issues, including  insurance coverage, employment and time off, and healthcare and government benefits.  Relationships after cancer  Cancer can have a profound impact on your relationships. This can include our friendships (new and old), our family, our  co-workers, or even a dating/intimate relationship. As cancer survivors, you may ask yourself: when, what and how do I tell  someone that I am a cancer survivor? Or perhaps, do I even need to tell them?  Telling someone about your cancer history can be particularly challenging if you are thinking about dating and new  intimate relationships after cancer. You may have physical scars from your cancer treatment. You may also experience  changes to your sexual health and function as a result of your treatment. A new partner may bring up questions about your  ability to get pregnant or father a child. This is a lot for a cancer survivor  "to navigate.  Its important that you are comfortable in your own skin. Think about what you want to share with someone new in your  life. Perhaps, give them small pieces of information to start to gauge their comfort level. If they ask questions about your  scars, be prepared with what you want to say about them. Practice your talk with a trusted friend. Therapy after treatment  can also help you strategize how to talk with a new partner about your cancer history and its long-term impact on your life.  Sexual health and fertility  How cancer affects your sexuality is different for every survivor. Some find the support they need through their healthcare  team, their partner, friends, or fellow survivors. Some cancer advocacy groups host discussion boards where you can "talk"  about concerns with someone who has been there. The American Cancer Society and [employer] both offer sexuality  information for men and women. Couples and/or sex therapists are extremely helpful for couples who are struggling with  intimacy after cancer. Ask your team or  for referrals to therapists who are experienced in sexual health issues  after an illness.  Issues related to sexuality or fertility can be emotionally draining and can interfere with personal relationships at a time  when you need them most. On[employer]Link's section on fertility & sexuality may be helpful. Organizations such as the  Onc[facility] Consortium, [employer], and [employer] can help with fertility questions and issues. [employer] and the  American Cancer Society both provide fertility and sexuality resources.  All of this can be a bit overwhelming, but the fact that there are over 17 million cancer survivors in the [place] today  is a testament to the fact that you can do this! Take it one day at a time and seek the support you need to live and love your  "new normal" life.  13/13    "

## 2022-05-09 NOTE — PROGRESS NOTES
Subjective:       Patient ID: Nadja Monae is a 76 y.o. female.      Chief Complaint: No chief complaint on file.  Survivorship and Endocrine Therapy education  HPI   77 yo female found on screening MMG to have a focal asymmetry left breast UOQ. Diagnostic MMG/US showed asymmetry, lobulated mass at 2:00 left breast 4CMFN measuring 1cm, suspicious. Patient underwent biopsy and pathology showed IDCA, mucinous type, intermediate grade, measuring at least 0.7cm, strongly ER and NC positive and Her2 negative. Patient was referred to Dr. Brandon Samano. 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 She has been referred to me for recommendations for adjuvant treatment. Patient reports menarche age 12, OCPs for a few years, first pregnancy age 30, MARY and BSO in 30s, +HRT 10+ years. FH first cousin with breast cancer in 60s, father with lung cancer.     Past Medical History:   Diagnosis Date    Anxiety       Left Breast Cancer Stage IA ([id number])--Diagnosed 22  Biopsy/pathology:  Left breast biopsy done 22--2:00 4CMFN core biopsies positive for invasive ductal carcinoma, mucinous type, intermediate grade, measuring at least 0.7cm, DCIS, cribriform pattern, intermediate grade, w/o necrosis, %, NC 87%, Her2 1+ by IHC, negative by FISH, Ki67 5.3%.  Surgery/pathology: Left breast lumpectomy with SLN biopsy done 3/21/22--invasive mucinous carcinoma, well differentiated, Grade 1, measuring 0.9cm, clear margins, 4 negative SLNs.   Imagin. Screening Oscar MMG bilateral done at [facility] 22--an asymmetry anterior UOQ of left breast, measures 1.3cm, needs additional imaging, BIRADS 0.  2. Diagnostic MMG/US done at [facility] 2/15/22--lobulated mass in left breast at 2:00 4CMFN, measures 0.9X0.5X1cm, suspicious by US, focal asymmetry in UOQ of left breast does persist on MMG, BIRADS 4, biopsy recommeded.      Treatment  history:  Left breast lumpectomy and SLN biopsy 3/21/22.     Current treatment plan:  1. Decision regarding radiation  2. Femara X 5 years  Review of patient's allergies indicates:  No Known Allergies   Current Outpatient Medications on File Prior to Visit   Medication Sig Dispense Refill    allopurinoL (ZYLOPRIM) 300 MG tablet Take 300 mg by mouth once daily.      EScitalopram oxalate (LEXAPRO) 20 MG tablet Take 20 mg by mouth once daily.      glimepiride (AMARYL) 4 MG tablet Take 4 mg by mouth once daily.      isosorbide mononitrate (IMDUR) 30 MG 24 hr tablet Take 30 mg by mouth once daily.      letrozole (FEMARA) 2.5 mg Tab Take 1 tablet (2.5 mg total) by mouth once daily. 30 tablet 2    metFORMIN (GLUCOPHAGE) 1000 MG tablet Take 1,000 mg by mouth 2 (two) times daily.      pioglitazone (ACTOS) 30 MG tablet Take 30 mg by mouth once daily.      rosuvastatin (CRESTOR) 20 MG tablet Take 20 mg by mouth Daily.      VICTOZA 3-LINDSEY 0.6 mg/0.1 mL (18 mg/3 mL) PnIj pen Inject 3 mLs into the skin once daily.       No current facility-administered medications on file prior to visit.      Review of Systems   Constitutional: Negative for activity change, appetite change, chills, diaphoresis, fatigue, fever and unexpected weight change.   HENT: Negative for mouth dryness, mouth sores, sore throat and trouble swallowing.    Eyes: Negative for photophobia and visual disturbance.   Respiratory: Negative for cough, chest tightness, shortness of breath and wheezing.    Cardiovascular: Negative for chest pain and leg swelling.   Gastrointestinal: Negative for abdominal pain, constipation, diarrhea and nausea.   Genitourinary: Negative for difficulty urinating and hot flashes.   Musculoskeletal: Negative for arthralgias and back pain.   Allergic/Immunologic: Negative for food allergies.   Neurological: Negative for dizziness, weakness, light-headedness and headaches.   Psychiatric/Behavioral: Negative for sleep disturbance.  The patient is not nervous/anxious.               ECOG SCORE       0  No results for input(s): COMP, CBC in the last 72 hours.       Assessment:       Problem List Items Addressed This Visit    None            Plan:       Information on Letrozole and breast cancer survivorship treatment summary and care plan discussed all questions answered and copies given to patient.  present.    RTC 6/10/2022 at 1230 lab and 6/14/2022 at 1120 visit with Dr. Moss.

## 2022-05-10 ENCOUNTER — OFFICE VISIT (OUTPATIENT)
Dept: HEMATOLOGY/ONCOLOGY | Facility: CLINIC | Age: 77
End: 2022-05-10
Payer: MEDICARE

## 2022-05-10 VITALS
SYSTOLIC BLOOD PRESSURE: 115 MMHG | OXYGEN SATURATION: 98 % | DIASTOLIC BLOOD PRESSURE: 68 MMHG | HEART RATE: 67 BPM | WEIGHT: 142.19 LBS

## 2022-05-10 DIAGNOSIS — C50.412 MALIGNANT NEOPLASM OF UPPER-OUTER QUADRANT OF LEFT BREAST IN FEMALE, ESTROGEN RECEPTOR POSITIVE: ICD-10-CM

## 2022-05-10 DIAGNOSIS — Z17.0 MALIGNANT NEOPLASM OF UPPER-OUTER QUADRANT OF LEFT BREAST IN FEMALE, ESTROGEN RECEPTOR POSITIVE: ICD-10-CM

## 2022-05-10 PROCEDURE — 77334 RADIATION TREATMENT AID(S): CPT | Performed by: RADIOLOGY

## 2022-05-10 PROCEDURE — 1159F MED LIST DOCD IN RCRD: CPT | Mod: CPTII,S$GLB,, | Performed by: INTERNAL MEDICINE

## 2022-05-10 PROCEDURE — 1101F PR PT FALLS ASSESS DOC 0-1 FALLS W/OUT INJ PAST YR: ICD-10-PCS | Mod: CPTII,S$GLB,, | Performed by: INTERNAL MEDICINE

## 2022-05-10 PROCEDURE — 3074F PR MOST RECENT SYSTOLIC BLOOD PRESSURE < 130 MM HG: ICD-10-PCS | Mod: CPTII,S$GLB,, | Performed by: INTERNAL MEDICINE

## 2022-05-10 PROCEDURE — 3074F SYST BP LT 130 MM HG: CPT | Mod: CPTII,S$GLB,, | Performed by: INTERNAL MEDICINE

## 2022-05-10 PROCEDURE — 3288F PR FALLS RISK ASSESSMENT DOCUMENTED: ICD-10-PCS | Mod: CPTII,S$GLB,, | Performed by: INTERNAL MEDICINE

## 2022-05-10 PROCEDURE — 99215 OFFICE O/P EST HI 40 MIN: CPT | Mod: S$GLB,,, | Performed by: INTERNAL MEDICINE

## 2022-05-10 PROCEDURE — 99999 PR PBB SHADOW E&M-EST. PATIENT-LVL IV: CPT | Mod: PBBFAC,,,

## 2022-05-10 PROCEDURE — 3078F DIAST BP <80 MM HG: CPT | Mod: CPTII,S$GLB,, | Performed by: INTERNAL MEDICINE

## 2022-05-10 PROCEDURE — 77295 3-D RADIOTHERAPY PLAN: CPT | Performed by: RADIOLOGY

## 2022-05-10 PROCEDURE — 3078F PR MOST RECENT DIASTOLIC BLOOD PRESSURE < 80 MM HG: ICD-10-PCS | Mod: CPTII,S$GLB,, | Performed by: INTERNAL MEDICINE

## 2022-05-10 PROCEDURE — 77300 RADIATION THERAPY DOSE PLAN: CPT | Performed by: RADIOLOGY

## 2022-05-10 PROCEDURE — 1159F PR MEDICATION LIST DOCUMENTED IN MEDICAL RECORD: ICD-10-PCS | Mod: CPTII,S$GLB,, | Performed by: INTERNAL MEDICINE

## 2022-05-10 PROCEDURE — 99999 PR PBB SHADOW E&M-EST. PATIENT-LVL IV: ICD-10-PCS | Mod: PBBFAC,,,

## 2022-05-10 PROCEDURE — 99215 PR OFFICE/OUTPT VISIT, EST, LEVL V, 40-54 MIN: ICD-10-PCS | Mod: S$GLB,,, | Performed by: INTERNAL MEDICINE

## 2022-05-10 PROCEDURE — 1101F PT FALLS ASSESS-DOCD LE1/YR: CPT | Mod: CPTII,S$GLB,, | Performed by: INTERNAL MEDICINE

## 2022-05-10 PROCEDURE — 1126F PR PAIN SEVERITY QUANTIFIED, NO PAIN PRESENT: ICD-10-PCS | Mod: CPTII,S$GLB,, | Performed by: INTERNAL MEDICINE

## 2022-05-10 PROCEDURE — 1126F AMNT PAIN NOTED NONE PRSNT: CPT | Mod: CPTII,S$GLB,, | Performed by: INTERNAL MEDICINE

## 2022-05-10 PROCEDURE — 3288F FALL RISK ASSESSMENT DOCD: CPT | Mod: CPTII,S$GLB,, | Performed by: INTERNAL MEDICINE

## 2022-05-11 PROCEDURE — 77412 RADIATION TX DELIVERY LVL 3: CPT | Performed by: RADIOLOGY

## 2022-05-11 PROCEDURE — 77280 THER RAD SIMULAJ FIELD SMPL: CPT | Performed by: RADIOLOGY

## 2022-05-12 ENCOUNTER — APPOINTMENT (OUTPATIENT)
Dept: RADIATION THERAPY | Facility: HOSPITAL | Age: 77
End: 2022-05-12
Attending: RADIOLOGY
Payer: MEDICARE

## 2022-05-12 PROCEDURE — 77387 GUIDANCE FOR RADJ TX DLVR: CPT | Performed by: RADIOLOGY

## 2022-05-12 PROCEDURE — 77412 RADIATION TX DELIVERY LVL 3: CPT | Performed by: RADIOLOGY

## 2022-05-14 NOTE — OP NOTE
Brandon Samano MD      Patient:   Nadja Monae            MRN: [id number]            FIN: [id number]               Age:   76 years     Sex:  Female     :  1945   Associated Diagnoses:   None   Author:   Selwyn GOLD, Brandon SRINIVASAN      Surgeon: Brandon Samano MD    Assistant: JOYCE Lozano    Preoperative Diagnosis: Left breast cancer upper outer quadrant    Postoperative Diagnosis:  Same    Procedure: Left breast ultrasound-guided wire localization/Lumpectomy,  sentinel lymph node mapping with neoprobe, left deep axillary sentinel lymph node biopsy, injection of 1 mCi of radioactive sulfur colloid for sentinel lymph node mapping with the neoprobe, Injection of 3 mL of Lymphazurin blue dye for sentinel lymph node mapping.    Anesthesia:  general endotracheal anesthesia     Estimated Blood Loss:  less than 15 mL     Intra-operative Findings:   ultrasound guidance with intraoperative wire localization was used to perform wide local excision of the hydro-pascale with adjacent mass, gross examination and sectioning of the specimen, as well as specimen radiograph revealed  adequate margin circumferentially.  4 deep axillary sentinel nodes were identified which were blue and hot, frozen section revealed no evidence of metastatic disease    Procedure in Detail:  After informed consent was obtained, the patient was brought to the operating room and placed in the supine position.  General endotracheal anesthesia was administered without difficulty.  In the holding area I injected 1 mCi of radioactive sulfur colloid for sentinel lymph node mapping with the neoprobe, in the operating room I injected 3 mL of Lymphazurin blue dye for sentinel lymph node mapping.  The breast and axilla were prepped and draped in sterile fashion.  Focused breast ultrasound was performed in the hydro-pascale with adjacent mass was clearly visible.  I performed intraoperative ultrasound-guided wire localization.  An incision was then made over  this area and wide local excision was performed using Bovie cautery.  The specimen was oriented with sutures long lateral and short superior and a specimen radiograph confirmed the marking clip nicely centered within the specimen.  Gross examination and sectioning of the specimen with the Pathologist revealed adequate margin circumferentially.  attention was turned towards the axilla, using the neoprobe and area of increased radioactivity was identified.  An incision was made over this area and carried down through the subcutaneous tissues using Bovie cautery.  Following blue stained lymphatic channels and using the neoprobe, 4 deep axillary sentinel nodes were identified which were blue and hot and were excised. Frozen section showed no evidence of metastatic carcinoma.  The wounds were irrigated with antimicrobials solution, meticulous hemostasis was achieved.  The wounds were closed in multiple layers with absorbable suture and sterile dressings were applied.  The patient tolerated the procedure well she was brought to recovery room in stable condition.

## 2022-05-14 NOTE — H&P
Patient:   Nadja Monae            MRN: [id number]            FIN: [id number]               Age:   76 years     Sex:  Female     :  1945   Associated Diagnoses:   None   Author:   Kathi Blakely      Health Status   The H&P was reviewed, the patient was examined, and there are no changes to the patient's condition..

## 2022-06-01 ENCOUNTER — OFFICE VISIT (OUTPATIENT)
Dept: RADIATION THERAPY | Facility: HOSPITAL | Age: 77
End: 2022-06-01
Attending: RADIOLOGY
Payer: MEDICARE

## 2022-06-01 PROCEDURE — 77387 GUIDANCE FOR RADJ TX DLVR: CPT | Performed by: RADIOLOGY

## 2022-06-01 PROCEDURE — 77412 RADIATION TX DELIVERY LVL 3: CPT | Performed by: RADIOLOGY

## 2022-06-09 NOTE — PROGRESS NOTES
Subjective:       Patient ID: Nadja Monae is a 76 y.o. female.    Referring physician: Dr. Brandon Samano  Reason for Referral: Breast Cancer    Left Breast Cancer Stage IA ([id number])--Diagnosed 22  Biopsy/pathology:  Left breast biopsy done 22--2:00 4CMFN core biopsies positive for invasive ductal carcinoma, mucinous type, intermediate grade, measuring at least 0.7cm, DCIS, cribriform pattern, intermediate grade, w/o necrosis, %, ID 87%, Her2 1+ by IHC, negative by FISH, Ki67 5.3%.  Surgery/pathology: Left breast lumpectomy with SLN biopsy done 3/21/22--invasive mucinous carcinoma, well differentiated, Grade 1, measuring 0.9cm, clear margins, 4 negative SLNs.   Imagin. Screening Oscar MMG bilateral done at [facility] 22--an asymmetry anterior UOQ of left breast, measures 1.3cm, needs additional imaging, BIRADS 0.  2. Diagnostic MMG/US done at [facility] 2/15/22--lobulated mass in left breast at 2:00 4CMFN, measures 0.9X0.5X1cm, suspicious by US, focal asymmetry in UOQ of left breast does persist on MMG, BIRADS 4, biopsy recommeded.     Treatment history:  Left breast lumpectomy and SLN biopsy 3/21/22.  Adjuvant radiation completed 22.    Current treatment plan:  Femara X 5 years to start 6/15/22    Chief Complaint: Breast Pain and Fatigue    HPI   Patient presents for follow-up of breast cancer. She completed radiation on 22. She has a mild skin burn and occasional sharp shooting pain to breast from radiation but overall is doing okay. Discussed starting Femara and she is in agreement.      Past Medical History:   Diagnosis Date    Anxiety       Review of patient's allergies indicates:  No Known Allergies   Current Outpatient Medications on File Prior to Visit   Medication Sig Dispense Refill    allopurinoL (ZYLOPRIM) 300 MG tablet Take 300 mg by mouth once daily.      EScitalopram oxalate (LEXAPRO) 20 MG tablet Take 20 mg by mouth once daily.      glimepiride (AMARYL) 4 MG tablet Take 4 mg by  mouth once daily.      isosorbide mononitrate (IMDUR) 30 MG 24 hr tablet Take 30 mg by mouth once daily.      letrozole (FEMARA) 2.5 mg Tab Take 1 tablet (2.5 mg total) by mouth once daily. 30 tablet 2    metFORMIN (GLUCOPHAGE) 1000 MG tablet Take 1,000 mg by mouth 2 (two) times daily.      pioglitazone (ACTOS) 30 MG tablet Take 30 mg by mouth once daily.      rosuvastatin (CRESTOR) 20 MG tablet Take 20 mg by mouth Daily.      VICTOZA 3-LINDSEY 0.6 mg/0.1 mL (18 mg/3 mL) PnIj pen Inject 3 mLs into the skin once daily.       No current facility-administered medications on file prior to visit.      Review of Systems   Constitutional: Negative for appetite change, fatigue, fever and unexpected weight change.   HENT: Negative for mouth sores.    Eyes: Negative.    Respiratory: Negative for cough and shortness of breath.    Cardiovascular: Negative for chest pain and leg swelling.   Gastrointestinal: Negative for abdominal distention, abdominal pain, constipation, diarrhea, nausea, vomiting and reflux.   Genitourinary: Negative for difficulty urinating, dysuria and hematuria.   Musculoskeletal: Negative for arthralgias and back pain.   Integumentary:  Negative for rash.        Mild pain and erythema to breast from radiation   Neurological: Negative for weakness and headaches.   Hematological: Negative for adenopathy.   Psychiatric/Behavioral: Negative for sleep disturbance. The patient is not nervous/anxious.             Physical Exam  Constitutional:       Appearance: Normal appearance.   HENT:      Head: Normocephalic.      Nose: Nose normal.      Mouth/Throat:      Mouth: Mucous membranes are moist.   Eyes:      Extraocular Movements: Extraocular movements intact.      Conjunctiva/sclera: Conjunctivae normal.   Cardiovascular:      Rate and Rhythm: Normal rate and regular rhythm.   Pulmonary:      Effort: Pulmonary effort is normal.      Breath sounds: Normal breath sounds.   Chest:   Breasts:      Right: Normal.         Comments: Left breast with incision outer quadrant healed well, left axillary incision healed, no palpable mass in either breast and no adenopathy, mild erythema from radiation  Abdominal:      General: Bowel sounds are normal. There is no distension.      Palpations: Abdomen is soft.      Tenderness: There is no abdominal tenderness.   Musculoskeletal:         General: Normal range of motion.   Skin:     General: Skin is warm.   Neurological:      General: No focal deficit present.      Mental Status: She is alert and oriented to person, place, and time.   Psychiatric:         Mood and Affect: Mood normal.         Judgment: Judgment normal.       No visits with results within 1 Day(s) from this visit.   Latest known visit with results is:   Lab Visit on 06/10/2022   Component Date Value Ref Range Status    Sodium Level 06/10/2022 142  136 - 145 mmol/L Final    Potassium Level 06/10/2022 3.8  3.5 - 5.1 mmol/L Final    Chloride 06/10/2022 103  98 - 107 mmol/L Final    Carbon Dioxide 06/10/2022 27  23 - 31 mmol/L Final    Glucose Level 06/10/2022 156 (A) 82 - 115 mg/dL Final    Blood Urea Nitrogen 06/10/2022 13.6  9.8 - 20.1 mg/dL Final    Creatinine 06/10/2022 0.65  0.55 - 1.02 mg/dL Final    Calcium Level Total 06/10/2022 9.5  8.4 - 10.2 mg/dL Final    Protein Total 06/10/2022 6.9  5.8 - 7.6 gm/dL Final    Albumin Level 06/10/2022 4.0  3.4 - 4.8 gm/dL Final    Globulin 06/10/2022 2.9  2.4 - 3.5 gm/dL Final    Albumin/Globulin Ratio 06/10/2022 1.4  1.1 - 2.0 ratio Final    Bilirubin Total 06/10/2022 0.5  <=1.5 mg/dL Final    Alkaline Phosphatase 06/10/2022 65  40 - 150 unit/L Final    Alanine Aminotransferase 06/10/2022 19  0 - 55 unit/L Final    Aspartate Aminotransferase 06/10/2022 21  5 - 34 unit/L Final    Estimated GFR-Non  06/10/2022 >60  mls/min/1.73/m2 Final    WBC 06/10/2022 4.3 (A) 4.5 - 11.5 x10(3)/mcL Final    RBC 06/10/2022 4.59  4.20 - 5.40 x10(6)/mcL Final     Hgb 06/10/2022 13.7  12.0 - 16.0 gm/dL Final    Hct 06/10/2022 41.8  37.0 - 47.0 % Final    MCV 06/10/2022 91.1  80.0 - 94.0 fL Final    MCH 06/10/2022 29.8  27.0 - 31.0 pg Final    MCHC 06/10/2022 32.8 (A) 33.0 - 36.0 mg/dL Final    RDW 06/10/2022 13.3  11.5 - 17.0 % Final    Platelet 06/10/2022 250  130 - 400 x10(3)/mcL Final    MPV 06/10/2022 8.9 (A) 9.4 - 12.4 fL Final    Neut % 06/10/2022 65.9  % Final    Lymph % 06/10/2022 20.7  % Final    Mono % 06/10/2022 9.6  % Final    Eos % 06/10/2022 3.3  % Final    Basophil % 06/10/2022 0.5  % Final    Lymph # 06/10/2022 0.89  0.6 - 4.6 x10(3)/mcL Final    Neut # 06/10/2022 2.8  2.1 - 9.2 x10(3)/mcL Final    Mono # 06/10/2022 0.41  0.1 - 1.3 x10(3)/mcL Final    Eos # 06/10/2022 0.14  0 - 0.9 x10(3)/mcL Final    Baso # 06/10/2022 0.02  0 - 0.2 x10(3)/mcL Final    IG# 06/10/2022 0.00  0 - 0.0155 x10(3)/mcL Final    IG% 06/10/2022 0.0  0 - 0.43 % Final       Assessment:       1. Malignant neoplasm of upper-outer quadrant of left breast in female, estrogen receptor positive            Plan:    Patient with Stage IA breast cancer ([id number]) s/p lumpectomy done 3/21/22. Tumor measured 9mm, Grade 1, invasive mucinous carcinoma, margins clear with 4 negative SLNs, strongly ER and MO positive and Her2 negative, with low Ki67.  Due to favorable histology, per NCCN, no chemotherapy recommended.  Treatment recommended with AI X 5 years.  Patient completed adjuvant radiation on 6/1/22.    Recommend to start Femara now.  She has already had her education.    Will try to obtain most recent bone density from [facility].    RTC 8 weeks for follow-up for Femara toxicity check.  If doing well after next visit, plan to change visits to every 6 months.      Follow-up MMG scheduled per Dr. Samano in 10/2022 per patient.     All questions answered at this time.    Dee Moss MD       Addendum:  [facility] DEXA 2/11/22--AP spine T-score 0.4, Total hip left -1.0, right -1.0, Femur left  -1.9, right -1.7 c/w osteopoenia bilateral femurs.

## 2022-06-10 ENCOUNTER — LAB VISIT (OUTPATIENT)
Dept: LAB | Facility: HOSPITAL | Age: 77
End: 2022-06-10
Attending: INTERNAL MEDICINE
Payer: MEDICARE

## 2022-06-10 DIAGNOSIS — Z17.0 MALIGNANT NEOPLASM OF UPPER-OUTER QUADRANT OF LEFT BREAST IN FEMALE, ESTROGEN RECEPTOR POSITIVE: ICD-10-CM

## 2022-06-10 DIAGNOSIS — C50.412 MALIGNANT NEOPLASM OF UPPER-OUTER QUADRANT OF LEFT BREAST IN FEMALE, ESTROGEN RECEPTOR POSITIVE: ICD-10-CM

## 2022-06-10 LAB
ALBUMIN SERPL-MCNC: 4 GM/DL (ref 3.4–4.8)
ALBUMIN/GLOB SERPL: 1.4 RATIO (ref 1.1–2)
ALP SERPL-CCNC: 65 UNIT/L (ref 40–150)
ALT SERPL-CCNC: 19 UNIT/L (ref 0–55)
AST SERPL-CCNC: 21 UNIT/L (ref 5–34)
BASOPHILS # BLD AUTO: 0.02 X10(3)/MCL (ref 0–0.2)
BASOPHILS NFR BLD AUTO: 0.5 %
BILIRUBIN DIRECT+TOT PNL SERPL-MCNC: 0.5 MG/DL
BUN SERPL-MCNC: 13.6 MG/DL (ref 9.8–20.1)
CALCIUM SERPL-MCNC: 9.5 MG/DL (ref 8.4–10.2)
CHLORIDE SERPL-SCNC: 103 MMOL/L (ref 98–107)
CO2 SERPL-SCNC: 27 MMOL/L (ref 23–31)
CREAT SERPL-MCNC: 0.65 MG/DL (ref 0.55–1.02)
EOSINOPHIL # BLD AUTO: 0.14 X10(3)/MCL (ref 0–0.9)
EOSINOPHIL NFR BLD AUTO: 3.3 %
ERYTHROCYTE [DISTWIDTH] IN BLOOD BY AUTOMATED COUNT: 13.3 % (ref 11.5–17)
GLOBULIN SER-MCNC: 2.9 GM/DL (ref 2.4–3.5)
GLUCOSE SERPL-MCNC: 156 MG/DL (ref 82–115)
HCT VFR BLD AUTO: 41.8 % (ref 37–47)
HGB BLD-MCNC: 13.7 GM/DL (ref 12–16)
IMM GRANULOCYTES # BLD AUTO: 0 X10(3)/MCL (ref 0–0.02)
IMM GRANULOCYTES NFR BLD AUTO: 0 % (ref 0–0.43)
LYMPHOCYTES # BLD AUTO: 0.89 X10(3)/MCL (ref 0.6–4.6)
LYMPHOCYTES NFR BLD AUTO: 20.7 %
MCH RBC QN AUTO: 29.8 PG (ref 27–31)
MCHC RBC AUTO-ENTMCNC: 32.8 MG/DL (ref 33–36)
MCV RBC AUTO: 91.1 FL (ref 80–94)
MONOCYTES # BLD AUTO: 0.41 X10(3)/MCL (ref 0.1–1.3)
MONOCYTES NFR BLD AUTO: 9.6 %
NEUTROPHILS # BLD AUTO: 2.8 X10(3)/MCL (ref 2.1–9.2)
NEUTROPHILS NFR BLD AUTO: 65.9 %
PLATELET # BLD AUTO: 250 X10(3)/MCL (ref 130–400)
PMV BLD AUTO: 8.9 FL (ref 9.4–12.4)
POTASSIUM SERPL-SCNC: 3.8 MMOL/L (ref 3.5–5.1)
PROT SERPL-MCNC: 6.9 GM/DL (ref 5.8–7.6)
RBC # BLD AUTO: 4.59 X10(6)/MCL (ref 4.2–5.4)
SODIUM SERPL-SCNC: 142 MMOL/L (ref 136–145)
WBC # SPEC AUTO: 4.3 X10(3)/MCL (ref 4.5–11.5)

## 2022-06-10 PROCEDURE — 80053 COMPREHEN METABOLIC PANEL: CPT

## 2022-06-10 PROCEDURE — 36415 COLL VENOUS BLD VENIPUNCTURE: CPT

## 2022-06-10 PROCEDURE — 85025 COMPLETE CBC W/AUTO DIFF WBC: CPT

## 2022-06-14 ENCOUNTER — OFFICE VISIT (OUTPATIENT)
Dept: HEMATOLOGY/ONCOLOGY | Facility: CLINIC | Age: 77
End: 2022-06-14
Payer: MEDICARE

## 2022-06-14 VITALS
SYSTOLIC BLOOD PRESSURE: 103 MMHG | OXYGEN SATURATION: 97 % | DIASTOLIC BLOOD PRESSURE: 66 MMHG | HEART RATE: 75 BPM | WEIGHT: 141.56 LBS | BODY MASS INDEX: 25.08 KG/M2 | TEMPERATURE: 98 F | RESPIRATION RATE: 14 BRPM | HEIGHT: 63 IN

## 2022-06-14 DIAGNOSIS — Z17.0 MALIGNANT NEOPLASM OF UPPER-OUTER QUADRANT OF LEFT BREAST IN FEMALE, ESTROGEN RECEPTOR POSITIVE: Primary | ICD-10-CM

## 2022-06-14 DIAGNOSIS — C50.412 MALIGNANT NEOPLASM OF UPPER-OUTER QUADRANT OF LEFT BREAST IN FEMALE, ESTROGEN RECEPTOR POSITIVE: Primary | ICD-10-CM

## 2022-06-14 PROCEDURE — 1126F AMNT PAIN NOTED NONE PRSNT: CPT | Mod: CPTII,S$GLB,, | Performed by: INTERNAL MEDICINE

## 2022-06-14 PROCEDURE — 1101F PT FALLS ASSESS-DOCD LE1/YR: CPT | Mod: CPTII,S$GLB,, | Performed by: INTERNAL MEDICINE

## 2022-06-14 PROCEDURE — 99999 PR PBB SHADOW E&M-EST. PATIENT-LVL IV: CPT | Mod: PBBFAC,,, | Performed by: INTERNAL MEDICINE

## 2022-06-14 PROCEDURE — 1159F MED LIST DOCD IN RCRD: CPT | Mod: CPTII,S$GLB,, | Performed by: INTERNAL MEDICINE

## 2022-06-14 PROCEDURE — 99999 PR PBB SHADOW E&M-EST. PATIENT-LVL IV: ICD-10-PCS | Mod: PBBFAC,,, | Performed by: INTERNAL MEDICINE

## 2022-06-14 PROCEDURE — 3074F SYST BP LT 130 MM HG: CPT | Mod: CPTII,S$GLB,, | Performed by: INTERNAL MEDICINE

## 2022-06-14 PROCEDURE — 99214 PR OFFICE/OUTPT VISIT, EST, LEVL IV, 30-39 MIN: ICD-10-PCS | Mod: S$GLB,,, | Performed by: INTERNAL MEDICINE

## 2022-06-14 PROCEDURE — 3288F PR FALLS RISK ASSESSMENT DOCUMENTED: ICD-10-PCS | Mod: CPTII,S$GLB,, | Performed by: INTERNAL MEDICINE

## 2022-06-14 PROCEDURE — 1159F PR MEDICATION LIST DOCUMENTED IN MEDICAL RECORD: ICD-10-PCS | Mod: CPTII,S$GLB,, | Performed by: INTERNAL MEDICINE

## 2022-06-14 PROCEDURE — 3074F PR MOST RECENT SYSTOLIC BLOOD PRESSURE < 130 MM HG: ICD-10-PCS | Mod: CPTII,S$GLB,, | Performed by: INTERNAL MEDICINE

## 2022-06-14 PROCEDURE — 3078F DIAST BP <80 MM HG: CPT | Mod: CPTII,S$GLB,, | Performed by: INTERNAL MEDICINE

## 2022-06-14 PROCEDURE — 1101F PR PT FALLS ASSESS DOC 0-1 FALLS W/OUT INJ PAST YR: ICD-10-PCS | Mod: CPTII,S$GLB,, | Performed by: INTERNAL MEDICINE

## 2022-06-14 PROCEDURE — 99214 OFFICE O/P EST MOD 30 MIN: CPT | Mod: S$GLB,,, | Performed by: INTERNAL MEDICINE

## 2022-06-14 PROCEDURE — 1126F PR PAIN SEVERITY QUANTIFIED, NO PAIN PRESENT: ICD-10-PCS | Mod: CPTII,S$GLB,, | Performed by: INTERNAL MEDICINE

## 2022-06-14 PROCEDURE — 1160F RVW MEDS BY RX/DR IN RCRD: CPT | Mod: CPTII,S$GLB,, | Performed by: INTERNAL MEDICINE

## 2022-06-14 PROCEDURE — 3078F PR MOST RECENT DIASTOLIC BLOOD PRESSURE < 80 MM HG: ICD-10-PCS | Mod: CPTII,S$GLB,, | Performed by: INTERNAL MEDICINE

## 2022-06-14 PROCEDURE — 3288F FALL RISK ASSESSMENT DOCD: CPT | Mod: CPTII,S$GLB,, | Performed by: INTERNAL MEDICINE

## 2022-06-14 PROCEDURE — 1160F PR REVIEW ALL MEDS BY PRESCRIBER/CLIN PHARMACIST DOCUMENTED: ICD-10-PCS | Mod: CPTII,S$GLB,, | Performed by: INTERNAL MEDICINE

## 2022-07-27 ENCOUNTER — HOSPITAL ENCOUNTER (OUTPATIENT)
Dept: RADIOLOGY | Facility: HOSPITAL | Age: 77
Discharge: HOME | End: 2022-07-27
Attending: FAMILY MEDICINE
Payer: MEDICARE

## 2022-07-27 DIAGNOSIS — M25.571 PAIN IN RIGHT ANKLE AND JOINTS OF RIGHT FOOT: ICD-10-CM

## 2022-07-27 PROCEDURE — 73610 X-RAY EXAM OF ANKLE: CPT | Mod: TC,RT

## 2022-08-01 NOTE — PROGRESS NOTES
Subjective:       Patient ID: Nadja Monae is a 76 y.o. female.    Referring physician: Dr. Brandon Samano  Reason for Referral: Breast Cancer    Left Breast Cancer Stage IA ([id number])--Diagnosed 22  Biopsy/pathology:  Left breast biopsy done 22--2:00 4CMFN core biopsies positive for invasive ductal carcinoma, mucinous type, intermediate grade, measuring at least 0.7cm, DCIS, cribriform pattern, intermediate grade, w/o necrosis, %, NH 87%, Her2 1+ by IHC, negative by FISH, Ki67 5.3%.  Surgery/pathology: Left breast lumpectomy with SLN biopsy done 3/21/22--invasive mucinous carcinoma, well differentiated, Grade 1, measuring 0.9cm, clear margins, 4 negative SLNs.   Imagin. Screening Oscar MMG bilateral done at [facility] 22--an asymmetry anterior UOQ of left breast, measures 1.3cm, needs additional imaging, BIRADS 0.  2. Diagnostic MMG/US done at [facility] 2/15/22--lobulated mass in left breast at 2:00 4CMFN, measures 0.9X0.5X1cm, suspicious by US, focal asymmetry in UOQ of left breast does persist on MMG, BIRADS 4, biopsy recommeded.     [facility] DEXA 22--AP spine T-score 0.4, Total hip left -1.0, right -1.0, Femur left -1.9, right -1.7 c/w osteopoenia bilateral femurs.     Treatment history:  Left breast lumpectomy and SLN biopsy 3/21/22.  Adjuvant radiation completed 22.    Current treatment plan:  Femara X 5 years. Started on 6/15/22    Chief Complaint: Other Misc (Pt reports), Dizziness, Nausea, Fatigue, and Diarrhea (Foggy headed and sweaty)    HPI   Patient presents for follow-up of breast cancer. She started Femara in 2022. Mentions she has been having nausea, dizziness and diarrhea since that time. Complains of hot flashes and foggy headed. Also mentions her PCP is working on changing up her diabetes medicines. Not sure if her complaints are all from the Femara. Regardless, we discussed holding the medication for a week to see if her symptoms resolve. Noted weight loss of 5 lbs since last  appointment. Baseline DEXA from February revealed osteopenia to bilateral femurs. States she is scheduled for MMG in October.      Past Medical History:   Diagnosis Date    Anxiety       Review of patient's allergies indicates:  No Known Allergies   Current Outpatient Medications on File Prior to Visit   Medication Sig Dispense Refill    allopurinoL (ZYLOPRIM) 300 MG tablet Take 300 mg by mouth once daily.      EScitalopram oxalate (LEXAPRO) 20 MG tablet Take 20 mg by mouth once daily.      isosorbide mononitrate (IMDUR) 30 MG 24 hr tablet Take 15 mg by mouth once daily.      letrozole (FEMARA) 2.5 mg Tab TAKE 1 TABLET(2.5 MG) BY MOUTH EVERY DAY 30 tablet 3    metFORMIN (GLUCOPHAGE) 1000 MG tablet Take 1,000 mg by mouth 2 (two) times daily.      ONETOUCH ULTRA TEST Strp 2 (two) times a day. Test      pioglitazone (ACTOS) 30 MG tablet Take 15 mg by mouth once daily.      rosuvastatin (CRESTOR) 20 MG tablet Take 5 mg by mouth Daily.      VICTOZA 3-LINDSEY 0.6 mg/0.1 mL (18 mg/3 mL) PnIj pen Inject 3 mLs into the skin once daily.      glimepiride (AMARYL) 4 MG tablet Take 4 mg by mouth once daily.       No current facility-administered medications on file prior to visit.      Review of Systems   Constitutional: Positive for fatigue and unexpected weight change. Negative for appetite change and fever.   HENT: Negative for mouth sores.    Eyes: Negative.    Respiratory: Negative for cough and shortness of breath.    Cardiovascular: Negative for chest pain and leg swelling.   Gastrointestinal: Positive for diarrhea and nausea. Negative for abdominal distention, abdominal pain, constipation, vomiting and reflux.   Endocrine:        Hot flashes   Genitourinary: Negative for difficulty urinating, dysuria and hematuria.   Musculoskeletal: Negative for arthralgias and back pain.   Integumentary:  Negative for rash.   Neurological: Negative for weakness and headaches.        Foggy   Hematological: Negative for  adenopathy.   Psychiatric/Behavioral: Negative for sleep disturbance. The patient is not nervous/anxious.          Physical Exam  Constitutional:       Appearance: Normal appearance.   HENT:      Head: Normocephalic.      Nose: Nose normal.      Mouth/Throat:      Mouth: Mucous membranes are moist.   Eyes:      Extraocular Movements: Extraocular movements intact.      Conjunctiva/sclera: Conjunctivae normal.   Cardiovascular:      Rate and Rhythm: Normal rate and regular rhythm.      Heart sounds: Normal heart sounds.   Pulmonary:      Effort: Pulmonary effort is normal.      Breath sounds: Normal breath sounds.   Chest:   Breasts:      Right: Normal.        Comments: Left breast with incision outer quadrant healed well, left axillary incision healed, no palpable mass in either breast and no adenopathy  Abdominal:      General: Abdomen is flat. Bowel sounds are normal. There is no distension.      Palpations: Abdomen is soft.      Tenderness: There is no abdominal tenderness.   Musculoskeletal:         General: Normal range of motion.      Right lower leg: No edema.      Left lower leg: No edema.   Skin:     General: Skin is warm and dry.   Neurological:      General: No focal deficit present.      Mental Status: She is alert and oriented to person, place, and time.   Psychiatric:         Attention and Perception: Attention normal.         Mood and Affect: Mood normal.         Speech: Speech normal.         Behavior: Behavior is cooperative.         Judgment: Judgment normal.       No visits with results within 1 Day(s) from this visit.   Latest known visit with results is:   Lab Visit on 08/05/2022   Component Date Value Ref Range Status    Sodium Level 08/05/2022 140  136 - 145 mmol/L Final    Potassium Level 08/05/2022 3.9  3.5 - 5.1 mmol/L Final    Chloride 08/05/2022 104  98 - 107 mmol/L Final    Carbon Dioxide 08/05/2022 24  23 - 31 mmol/L Final    Glucose Level 08/05/2022 156 (A) 82 - 115 mg/dL Final     Blood Urea Nitrogen 08/05/2022 11.2  9.8 - 20.1 mg/dL Final    Creatinine 08/05/2022 0.73  0.55 - 1.02 mg/dL Final    Calcium Level Total 08/05/2022 9.7  8.4 - 10.2 mg/dL Final    Protein Total 08/05/2022 7.1  5.8 - 7.6 gm/dL Final    Albumin Level 08/05/2022 4.0  3.4 - 4.8 gm/dL Final    Globulin 08/05/2022 3.1  2.4 - 3.5 gm/dL Final    Albumin/Globulin Ratio 08/05/2022 1.3  1.1 - 2.0 ratio Final    Bilirubin Total 08/05/2022 0.9  <=1.5 mg/dL Final    Alkaline Phosphatase 08/05/2022 52  40 - 150 unit/L Final    Alanine Aminotransferase 08/05/2022 14  0 - 55 unit/L Final    Aspartate Aminotransferase 08/05/2022 17  5 - 34 unit/L Final    Estimated GFR-Non  08/05/2022 >60  mls/min/1.73/m2 Final    WBC 08/05/2022 5.4  4.5 - 11.5 x10(3)/mcL Final    RBC 08/05/2022 4.65  4.20 - 5.40 x10(6)/mcL Final    Hgb 08/05/2022 14.0  12.0 - 16.0 gm/dL Final    Hct 08/05/2022 41.9  37.0 - 47.0 % Final    MCV 08/05/2022 90.1  80.0 - 94.0 fL Final    MCH 08/05/2022 30.1  27.0 - 31.0 pg Final    MCHC 08/05/2022 33.4  33.0 - 36.0 mg/dL Final    RDW 08/05/2022 12.3  11.5 - 17.0 % Final    Platelet 08/05/2022 251  130 - 400 x10(3)/mcL Final    MPV 08/05/2022 9.1  7.4 - 10.4 fL Final    Neut % 08/05/2022 67.3  % Final    Lymph % 08/05/2022 22.2  % Final    Mono % 08/05/2022 9.4  % Final    Eos % 08/05/2022 0.9  % Final    Basophil % 08/05/2022 0.2  % Final    Lymph # 08/05/2022 1.21  0.6 - 4.6 x10(3)/mcL Final    Neut # 08/05/2022 3.7  2.1 - 9.2 x10(3)/mcL Final    Mono # 08/05/2022 0.51  0.1 - 1.3 x10(3)/mcL Final    Eos # 08/05/2022 0.05  0 - 0.9 x10(3)/mcL Final    Baso # 08/05/2022 0.01  0 - 0.2 x10(3)/mcL Final    IG# 08/05/2022 0.00  0 - 0.04 x10(3)/mcL Final    IG% 08/05/2022 0.0  % Final       Assessment:       1. Malignant neoplasm of upper-outer quadrant of left breast in female, estrogen receptor positive    2. Osteopenia of necks of both femurs            Plan:    Patient with  Stage IA breast cancer ([id number]). S/p lumpectomy on 3/21/22. Tumor measured 9mm, Grade 1, invasive mucinous carcinoma, margins clear with 4 negative SLNs, strongly ER and [place] positive and Her2 negative, with low Ki67.  Due to favorable histology, per NCCN, no chemotherapy recommended.  Treatment recommended with AI X 5 years.  Patient completed adjuvant radiation on 6/1/22.    Currently patient is without any clinical symptoms or laboratory evidence of recurrence.   Started Femara on 6/15/22. Multiple side effects reported today. We discussed holding the medication for 1 week to see if symptoms improve. If symptoms persist, she will contact her PCP for additional workup.   Patient will contact me next week for an update. If her symptoms have resolved we discussed changing to Arimidex daily.   Recent labs all good.   Baseline DEXA in February revealed osteopenia. We discussed different options and she is declining Prolia at this time. However, given her current GI symptoms I am hesitant to start Fosamax or Boniva at this time. We plan to wait until her next appointment.   Continue Caltrate + D twice daily for now.   Follow-up MMG scheduled per Dr. Samano in 10/2022 per patient.   Will begin surveillance visits every 3 months.     All questions answered at this time.      GUSTAVO Abel

## 2022-08-05 ENCOUNTER — LAB VISIT (OUTPATIENT)
Dept: LAB | Facility: HOSPITAL | Age: 77
End: 2022-08-05
Attending: INTERNAL MEDICINE
Payer: MEDICARE

## 2022-08-05 DIAGNOSIS — Z17.0 MALIGNANT NEOPLASM OF UPPER-OUTER QUADRANT OF LEFT BREAST IN FEMALE, ESTROGEN RECEPTOR POSITIVE: ICD-10-CM

## 2022-08-05 DIAGNOSIS — C50.412 MALIGNANT NEOPLASM OF UPPER-OUTER QUADRANT OF LEFT BREAST IN FEMALE, ESTROGEN RECEPTOR POSITIVE: ICD-10-CM

## 2022-08-05 LAB
ALBUMIN SERPL-MCNC: 4 GM/DL (ref 3.4–4.8)
ALBUMIN/GLOB SERPL: 1.3 RATIO (ref 1.1–2)
ALP SERPL-CCNC: 52 UNIT/L (ref 40–150)
ALT SERPL-CCNC: 14 UNIT/L (ref 0–55)
AST SERPL-CCNC: 17 UNIT/L (ref 5–34)
BASOPHILS # BLD AUTO: 0.01 X10(3)/MCL (ref 0–0.2)
BASOPHILS NFR BLD AUTO: 0.2 %
BILIRUBIN DIRECT+TOT PNL SERPL-MCNC: 0.9 MG/DL
BUN SERPL-MCNC: 11.2 MG/DL (ref 9.8–20.1)
CALCIUM SERPL-MCNC: 9.7 MG/DL (ref 8.4–10.2)
CHLORIDE SERPL-SCNC: 104 MMOL/L (ref 98–107)
CO2 SERPL-SCNC: 24 MMOL/L (ref 23–31)
CREAT SERPL-MCNC: 0.73 MG/DL (ref 0.55–1.02)
EOSINOPHIL # BLD AUTO: 0.05 X10(3)/MCL (ref 0–0.9)
EOSINOPHIL NFR BLD AUTO: 0.9 %
ERYTHROCYTE [DISTWIDTH] IN BLOOD BY AUTOMATED COUNT: 12.3 % (ref 11.5–17)
GLOBULIN SER-MCNC: 3.1 GM/DL (ref 2.4–3.5)
GLUCOSE SERPL-MCNC: 156 MG/DL (ref 82–115)
HCT VFR BLD AUTO: 41.9 % (ref 37–47)
HGB BLD-MCNC: 14 GM/DL (ref 12–16)
IMM GRANULOCYTES # BLD AUTO: 0 X10(3)/MCL (ref 0–0.04)
IMM GRANULOCYTES NFR BLD AUTO: 0 %
LYMPHOCYTES # BLD AUTO: 1.21 X10(3)/MCL (ref 0.6–4.6)
LYMPHOCYTES NFR BLD AUTO: 22.2 %
MCH RBC QN AUTO: 30.1 PG (ref 27–31)
MCHC RBC AUTO-ENTMCNC: 33.4 MG/DL (ref 33–36)
MCV RBC AUTO: 90.1 FL (ref 80–94)
MONOCYTES # BLD AUTO: 0.51 X10(3)/MCL (ref 0.1–1.3)
MONOCYTES NFR BLD AUTO: 9.4 %
NEUTROPHILS # BLD AUTO: 3.7 X10(3)/MCL (ref 2.1–9.2)
NEUTROPHILS NFR BLD AUTO: 67.3 %
PLATELET # BLD AUTO: 251 X10(3)/MCL (ref 130–400)
PMV BLD AUTO: 9.1 FL (ref 7.4–10.4)
POTASSIUM SERPL-SCNC: 3.9 MMOL/L (ref 3.5–5.1)
PROT SERPL-MCNC: 7.1 GM/DL (ref 5.8–7.6)
RBC # BLD AUTO: 4.65 X10(6)/MCL (ref 4.2–5.4)
SODIUM SERPL-SCNC: 140 MMOL/L (ref 136–145)
WBC # SPEC AUTO: 5.4 X10(3)/MCL (ref 4.5–11.5)

## 2022-08-05 PROCEDURE — 36415 COLL VENOUS BLD VENIPUNCTURE: CPT

## 2022-08-05 PROCEDURE — 80053 COMPREHEN METABOLIC PANEL: CPT

## 2022-08-05 PROCEDURE — 85025 COMPLETE CBC W/AUTO DIFF WBC: CPT

## 2022-08-09 ENCOUNTER — OFFICE VISIT (OUTPATIENT)
Dept: HEMATOLOGY/ONCOLOGY | Facility: CLINIC | Age: 77
End: 2022-08-09
Payer: MEDICARE

## 2022-08-09 VITALS
SYSTOLIC BLOOD PRESSURE: 122 MMHG | BODY MASS INDEX: 23.18 KG/M2 | HEART RATE: 71 BPM | HEIGHT: 64 IN | OXYGEN SATURATION: 99 % | WEIGHT: 135.81 LBS | DIASTOLIC BLOOD PRESSURE: 76 MMHG | RESPIRATION RATE: 14 BRPM | TEMPERATURE: 98 F

## 2022-08-09 DIAGNOSIS — M85.852 OSTEOPENIA OF NECKS OF BOTH FEMURS: ICD-10-CM

## 2022-08-09 DIAGNOSIS — M85.851 OSTEOPENIA OF NECKS OF BOTH FEMURS: ICD-10-CM

## 2022-08-09 DIAGNOSIS — C50.412 MALIGNANT NEOPLASM OF UPPER-OUTER QUADRANT OF LEFT BREAST IN FEMALE, ESTROGEN RECEPTOR POSITIVE: Primary | ICD-10-CM

## 2022-08-09 DIAGNOSIS — Z17.0 MALIGNANT NEOPLASM OF UPPER-OUTER QUADRANT OF LEFT BREAST IN FEMALE, ESTROGEN RECEPTOR POSITIVE: Primary | ICD-10-CM

## 2022-08-09 PROCEDURE — 3078F PR MOST RECENT DIASTOLIC BLOOD PRESSURE < 80 MM HG: ICD-10-PCS | Mod: CPTII,S$GLB,, | Performed by: NURSE PRACTITIONER

## 2022-08-09 PROCEDURE — 3288F FALL RISK ASSESSMENT DOCD: CPT | Mod: CPTII,S$GLB,, | Performed by: NURSE PRACTITIONER

## 2022-08-09 PROCEDURE — 3074F PR MOST RECENT SYSTOLIC BLOOD PRESSURE < 130 MM HG: ICD-10-PCS | Mod: CPTII,S$GLB,, | Performed by: NURSE PRACTITIONER

## 2022-08-09 PROCEDURE — 3288F PR FALLS RISK ASSESSMENT DOCUMENTED: ICD-10-PCS | Mod: CPTII,S$GLB,, | Performed by: NURSE PRACTITIONER

## 2022-08-09 PROCEDURE — 1101F PR PT FALLS ASSESS DOC 0-1 FALLS W/OUT INJ PAST YR: ICD-10-PCS | Mod: CPTII,S$GLB,, | Performed by: NURSE PRACTITIONER

## 2022-08-09 PROCEDURE — 99214 OFFICE O/P EST MOD 30 MIN: CPT | Mod: S$GLB,,, | Performed by: NURSE PRACTITIONER

## 2022-08-09 PROCEDURE — 1101F PT FALLS ASSESS-DOCD LE1/YR: CPT | Mod: CPTII,S$GLB,, | Performed by: NURSE PRACTITIONER

## 2022-08-09 PROCEDURE — 3074F SYST BP LT 130 MM HG: CPT | Mod: CPTII,S$GLB,, | Performed by: NURSE PRACTITIONER

## 2022-08-09 PROCEDURE — 99999 PR PBB SHADOW E&M-EST. PATIENT-LVL IV: CPT | Mod: PBBFAC,,, | Performed by: NURSE PRACTITIONER

## 2022-08-09 PROCEDURE — 99214 PR OFFICE/OUTPT VISIT, EST, LEVL IV, 30-39 MIN: ICD-10-PCS | Mod: S$GLB,,, | Performed by: NURSE PRACTITIONER

## 2022-08-09 PROCEDURE — 1159F MED LIST DOCD IN RCRD: CPT | Mod: CPTII,S$GLB,, | Performed by: NURSE PRACTITIONER

## 2022-08-09 PROCEDURE — 1126F PR PAIN SEVERITY QUANTIFIED, NO PAIN PRESENT: ICD-10-PCS | Mod: CPTII,S$GLB,, | Performed by: NURSE PRACTITIONER

## 2022-08-09 PROCEDURE — 1126F AMNT PAIN NOTED NONE PRSNT: CPT | Mod: CPTII,S$GLB,, | Performed by: NURSE PRACTITIONER

## 2022-08-09 PROCEDURE — 3078F DIAST BP <80 MM HG: CPT | Mod: CPTII,S$GLB,, | Performed by: NURSE PRACTITIONER

## 2022-08-09 PROCEDURE — 99999 PR PBB SHADOW E&M-EST. PATIENT-LVL IV: ICD-10-PCS | Mod: PBBFAC,,, | Performed by: NURSE PRACTITIONER

## 2022-08-09 PROCEDURE — 1159F PR MEDICATION LIST DOCUMENTED IN MEDICAL RECORD: ICD-10-PCS | Mod: CPTII,S$GLB,, | Performed by: NURSE PRACTITIONER

## 2022-08-09 RX ORDER — BLOOD SUGAR DIAGNOSTIC
STRIP MISCELLANEOUS 2 TIMES DAILY
COMMUNITY
Start: 2022-06-27

## 2022-08-10 DIAGNOSIS — R11.0 NAUSEA: Primary | ICD-10-CM

## 2022-08-11 RX ORDER — ONDANSETRON HYDROCHLORIDE 8 MG/1
8 TABLET, FILM COATED ORAL EVERY 8 HOURS PRN
Qty: 30 TABLET | Refills: 1 | Status: SHIPPED | OUTPATIENT
Start: 2022-08-11 | End: 2022-11-09

## 2022-08-17 ENCOUNTER — TELEPHONE (OUTPATIENT)
Dept: HEMATOLOGY/ONCOLOGY | Facility: CLINIC | Age: 77
End: 2022-08-17
Payer: MEDICARE

## 2022-08-17 NOTE — TELEPHONE ENCOUNTER
"Patient has been off of Femara and is still experiencing "fogginess" and dizziness as well as diarrhea. I instructed her to contact PCP for evaluation. Femara more than likely not the cause of these sx at this point. She will resume medication and contact PCP for evaluation.  "

## 2022-10-21 ENCOUNTER — DOCUMENTATION ONLY (OUTPATIENT)
Dept: SURGICAL ONCOLOGY | Facility: CLINIC | Age: 77
End: 2022-10-21
Payer: MEDICARE

## 2022-10-21 NOTE — PROGRESS NOTES
2-23-22  DR. JOCELYN GARDNER OFFICE NOTE      * Final Report *  Chief Complaint BREAST CANCER History of Present Illness 76 year-old-female referred by Dr. Wendy Siddiqui who underwent routine screening mammogram showing calcifications in both breast and an asymmetry in the outer left breast, 2 o'clock position, 4 cm from the nipple.. Diagnostic imaging showed a left breast lobulated mass measuring 1cm. Biopsy was performed, pathology showing invasive ductal carcinoma. Breast receptor studies are pending. The patient reports remote history of a benign breast biopsy in the past. She reports no family history of breast or ovarian cancer. Review of Systems 14 point review of systems was performed and was negative except for those pertinent positives and negatives mentioned in the history of present illness Physical Exam Vitals & Measurements HR: 89(Peripheral) BP: 127/67 HT: 160.00 cm WT: 62.400 kg BMI: 24.38 General: Alert and oriented, No acute distress.  Eye: Pupils are equal, round and reactive to light, Extraocular movements are intact, Normal conjunctiva, Vision unchanged.  HENT: Normal hearing, Oral mucosa is moist, No pharyngeal erythema, Ear canals patent, No sinus tenderness.  Neck: Supple, Non-tender, No carotid bruit, No jugular venous distention, No lymphadenopathy, No thyromegaly.  Respiratory: Lungs are clear to auscultation, Respirations are non-labored, Breath sounds are equal, Symmetrical chest wall expansion, No chest wall tenderness.  Cardiovascular: Normal rate, Regular rhythm, No murmur, No gallop, Good pulses equal in all extremities, Normal peripheral perfusion, No edema.  Genitourinary: No costovertebral angle tenderness, No inguinal tenderness, No urethral discharge, No lesions.  Lymphatics: No lymphadenopathy neck, axilla, groin.  Musculoskeletal: Normal range of motion, Normal strength, No tenderness, No swelling, No deformity, Normal gait.  Integumentary: Warm, Pink, Intact, Moist, No pallor,  No rash.  Cognition and Speech: Oriented, Speech clear and coherent, Functional cognition intact.   Abdomen: Soft nontender, nondistended, no palpable masses  Breast: No palpable lesions in bilateral breast, no skin changes, nipple inversion or discharge bilaterally.    Ultrasound: Focused left breast ultrasound was performed in the hydromark with adjacent small mass was clearly visible at the 2 o'clock position 4 cm from the nipple, superficial depth Assessment/Plan 1. Breast cancer in female C50.919   Diagnosis, staging, prognosis and treatment guidelines for Breast cancer were described in detail. Using visual aids and drawings I explained the anatomy and potential surgical procedures.  She is interested in breast conservation therapy and is an excellent candidate for this  Scheduled for ultrasound-guided left breast wire localization, lumpectomy, sentinel lymph node mapping and biopsy  The risks and benefits of this procedure were explained in detail, all questions were answered, the patient voiced understanding, and gives their consent to proceed.  Ordered:   Office/Outpatient Visit Level 5 New [id number] PC, Breast cancer in female, [facility], 02/23/22 13:50:00 CST  Procedure/Surgical History ORIF right upper extremity, right knee replacement, lap cholecystectomy, C-sections    Past medical history  Diabetes Medications No active medications Diabetes medication Allergies No active allergies Social History No history of alcohol tobacco or drug use Family History She denies family history of breast, ovarian or other malignancy.      Result type: Surgery Office/Clinic Note   Result date: February 23, 2022 13:00 CST   Result status: Auth (Verified)   Result title: Office Visit Note   Performed by: Katty Gary on February 21, 2022 16:07 CST   Verified by: Brandon Samano MD on February 23, 2022 14:00 CST   Encounter info: [id number], [facility], Clinic Visit, 2/23/2022 - 2/23/2022

## 2022-10-21 NOTE — PROGRESS NOTES
4-11-22  KATHI PEACE NP POST OP NOTE      * Final Report *  Subjective POST OP LEFT LUMPECTOMY WITH SN BIOPSY   Review of Systems PT REPORTS DISCOMFORT AND REDNESS TO LEFT AXILLA  DENIES FEVER OR CHILLS  STATES STARTED OVER WEEKEND  DENIES DRAINAGE  NO OTHER ISSUES REPORTED Objective   Physical Exam LEFT BREAST SOFT, NONTENDER  AXILLARY INCISION RED, WARM, MILDLY TENDER  INCISION INTACT, NO DRAINAGE   Assessment/Plan ASPIRATE APPROX 15CC APPEARS SEROUS FLUID LEFT AXILLA  SENT FOR CULTURE  WILL START PT ON BACTRIM BID  PATH: 0.9CM IDC, CLEAR MARGINS, NEG NODES, ER/FL POSITIVE  PATH DISCUSSED WITH PT AND QUESTIONS ANSWERED  WILL MAKE REFERRAL TO MED ONC AND RAD ONC  LEFT SIDED MAMMO 6 MONTHS WITH RETURN HERE    RTC 2 DAYS FOR INCISION RECHECK   Result type: Postoperative Note   Result date: April 11, 2022 13:05 CDT   Result status: Auth (Verified)   Result title: POST OP   Performed by: Kathi Blakely on April 11, 2022 13:05 CDT   Verified by: Kathi Blakely on April 11, 2022 13:05 CDT   Encounter info: [id number], [facility], Clinic Visit, 4/11/2022 - 4/11/2022

## 2022-10-26 ENCOUNTER — OFFICE VISIT (OUTPATIENT)
Dept: SURGICAL ONCOLOGY | Facility: CLINIC | Age: 77
End: 2022-10-26
Payer: MEDICARE

## 2022-10-26 VITALS
DIASTOLIC BLOOD PRESSURE: 74 MMHG | SYSTOLIC BLOOD PRESSURE: 126 MMHG | WEIGHT: 138 LBS | HEART RATE: 77 BPM | HEIGHT: 63 IN | BODY MASS INDEX: 24.45 KG/M2

## 2022-10-26 DIAGNOSIS — Z85.3 HX OF BREAST CANCER: Primary | ICD-10-CM

## 2022-10-26 DIAGNOSIS — C50.412 MALIGNANT NEOPLASM OF UPPER-OUTER QUADRANT OF LEFT BREAST IN FEMALE, ESTROGEN RECEPTOR POSITIVE: Primary | ICD-10-CM

## 2022-10-26 DIAGNOSIS — Z17.0 MALIGNANT NEOPLASM OF UPPER-OUTER QUADRANT OF LEFT BREAST IN FEMALE, ESTROGEN RECEPTOR POSITIVE: Primary | ICD-10-CM

## 2022-10-26 PROCEDURE — 99214 PR OFFICE/OUTPT VISIT, EST, LEVL IV, 30-39 MIN: ICD-10-PCS | Mod: S$GLB,,, | Performed by: SURGERY

## 2022-10-26 PROCEDURE — 1159F MED LIST DOCD IN RCRD: CPT | Mod: CPTII,S$GLB,, | Performed by: SURGERY

## 2022-10-26 PROCEDURE — 99999 PR PBB SHADOW E&M-EST. PATIENT-LVL III: CPT | Mod: PBBFAC,,, | Performed by: SURGERY

## 2022-10-26 PROCEDURE — 3074F PR MOST RECENT SYSTOLIC BLOOD PRESSURE < 130 MM HG: ICD-10-PCS | Mod: CPTII,S$GLB,, | Performed by: SURGERY

## 2022-10-26 PROCEDURE — 3078F PR MOST RECENT DIASTOLIC BLOOD PRESSURE < 80 MM HG: ICD-10-PCS | Mod: CPTII,S$GLB,, | Performed by: SURGERY

## 2022-10-26 PROCEDURE — 99214 OFFICE O/P EST MOD 30 MIN: CPT | Mod: S$GLB,,, | Performed by: SURGERY

## 2022-10-26 PROCEDURE — 3074F SYST BP LT 130 MM HG: CPT | Mod: CPTII,S$GLB,, | Performed by: SURGERY

## 2022-10-26 PROCEDURE — 1159F PR MEDICATION LIST DOCUMENTED IN MEDICAL RECORD: ICD-10-PCS | Mod: CPTII,S$GLB,, | Performed by: SURGERY

## 2022-10-26 PROCEDURE — 99999 PR PBB SHADOW E&M-EST. PATIENT-LVL III: ICD-10-PCS | Mod: PBBFAC,,, | Performed by: SURGERY

## 2022-10-26 PROCEDURE — 3078F DIAST BP <80 MM HG: CPT | Mod: CPTII,S$GLB,, | Performed by: SURGERY

## 2022-10-26 NOTE — PROGRESS NOTES
Chief complaint:  Breast cancer follow-up     HPI: 76 year-old-female referred by Dr. Wendy Siddiqui who underwent routine screening mammogram showing calcifications in both breast and an asymmetry in the outer left breast, 2 o'clock position, 4 cm from the nipple.. Diagnostic imaging showed a left breast lobulated mass measuring 1cm. Biopsy was performed, pathology showing invasive ductal carcinoma. Breast receptor studies are pending. The patient reports remote history of a benign breast biopsy in the past. She reports no family history of breast or ovarian cancer.    The patient underwent left breast lumpectomy with sentinel lymph node in March 2022 with final pathology revealing 9 mm invasive mucinous carcinoma well-differentiated grade 1 with negative margins, 4- sentinel nodes, ER/MO positive, HER2 negative, Ki-67 index 5.3%.  She completed adjuvant radiation therapy.  She is currently on Femara.  I reviewed medical oncology progress notes.    She denies breast masses, skin changes, nipple inversion or discharge on self-breast exam.    Recent surveillance mammography was reviewed, I personally interpreted the images and reviewed the report.  I discussed with the patient in detail and questions were addressed.  There are no suspicious findings.    Greater than 30 minutes was required for complete chart review, imaging review patient consultation and documentation            Past Medical and Surgical History  Allergies :   Patient has no known allergies.    @[facility]@  Medical :   She has a past medical history of Anxiety.    Surgical :   She has a past surgical history that includes Hysterectomy; Shoulder surgery; Cholecystectomy; Knee surgery; and LUMPECTOMY,BREAST,WITH RADIOACTIVE SEED LOCALIZATION AND SENTINEL LYMPH NODE BIOPSY (Left).     Family History  Her family history includes Cancer in her father; Diabetes in her brother.    Social History  She reports that she has never smoked. She has never used  smokeless tobacco. She reports that she does not currently use alcohol. She reports that she does not use drugs.     Review of Systems   Constitutional:  Negative for appetite change, chills, diaphoresis and fever.   HENT:  Negative for congestion, drooling, ear discharge, ear pain and hearing loss.    Eyes:  Negative for discharge.   Respiratory:  Negative for apnea, cough, choking, chest tightness, shortness of breath and stridor.    Cardiovascular:  Negative for chest pain, palpitations and leg swelling.   Endocrine: Negative for cold intolerance and heat intolerance.   Genitourinary:  Negative for difficulty urinating, dyspareunia, dysuria and hematuria.   Musculoskeletal:  Negative for arthralgias, gait problem and joint swelling.   Skin:  Negative for color change and rash.   Neurological:  Negative for dizziness, tremors, seizures, syncope, facial asymmetry, speech difficulty, light-headedness, numbness and headaches.   Psychiatric/Behavioral:  Negative for agitation and confusion.       Objective   Physical Exam  Vitals and nursing note reviewed.   Constitutional:       General: She is not in acute distress.     Appearance: Normal appearance. She is normal weight. She is not ill-appearing, toxic-appearing or diaphoretic.   HENT:      Head: Normocephalic and atraumatic.      Right Ear: External ear normal.      Left Ear: External ear normal.      Nose: Nose normal.      Mouth/Throat:      Mouth: Mucous membranes are moist.      Pharynx: Oropharynx is clear.   Eyes:      General: No scleral icterus.     Extraocular Movements: Extraocular movements intact.      Conjunctiva/sclera: Conjunctivae normal.      Pupils: Pupils are equal, round, and reactive to light.   Cardiovascular:      Rate and Rhythm: Normal rate and regular rhythm.      Pulses: Normal pulses.      Heart sounds: Normal heart sounds. No murmur heard.    No friction rub. No gallop.   Pulmonary:      Effort: Pulmonary effort is normal. No  respiratory distress.      Breath sounds: Normal breath sounds. No stridor. No wheezing or rhonchi.   Chest:      Chest wall: No tenderness.   Breasts:     Right: No swelling, bleeding, inverted nipple, mass, nipple discharge, skin change or tenderness.      Left: No swelling, bleeding, inverted nipple, mass, nipple discharge, skin change or tenderness.   Abdominal:      General: Abdomen is flat. There is no distension.      Palpations: Abdomen is soft. There is no mass.      Tenderness: There is no abdominal tenderness. There is no right CVA tenderness, left CVA tenderness, guarding or rebound.      Hernia: No hernia is present.   Musculoskeletal:         General: No swelling, tenderness, deformity or signs of injury. Normal range of motion.      Cervical back: Normal range of motion and neck supple. No rigidity or tenderness.      Right lower leg: No edema.      Left lower leg: No edema.   Lymphadenopathy:      Cervical: No cervical adenopathy.      Upper Body:      Right upper body: No supraclavicular or axillary adenopathy.      Left upper body: No supraclavicular or axillary adenopathy.   Skin:     General: Skin is warm.      Capillary Refill: Capillary refill takes less than 2 seconds.      Coloration: Skin is not jaundiced or pale.      Findings: No bruising, erythema, lesion or rash.   Neurological:      General: No focal deficit present.      Mental Status: She is alert and oriented to person, place, and time. Mental status is at baseline.      Cranial Nerves: No cranial nerve deficit.      Sensory: No sensory deficit.      Motor: No weakness.      Coordination: Coordination normal.      Gait: Gait normal.   Psychiatric:         Mood and Affect: Mood normal.         Behavior: Behavior normal.         Thought Content: Thought content normal.         Judgment: Judgment normal.     VITAL SIGNS: 24 HR MIN & MAX LAST    @FLOWSTAT(6:24::1)@           @FLOWSTAT(5:24::1)@  126/74     @FLOWSTAT(8:24::1)@  77      "@FLOWSTAT(9:24::1)@       @FLOWSTAT(10:24::1)@         HT: 5' 3" (160 cm)  WT: 62.6 kg (138 lb)  BMI: 24.5       Assessment & Plan     Stage I A left breast mucinous carcinoma status post breast conserving therapy     No evidence of disease  Return to clinic in 4 months when she is due for bilateral mammography with bilateral diagnostic mammogram.      "

## 2022-11-03 PROBLEM — E11.9 DIABETES MELLITUS: Status: ACTIVE | Noted: 2022-11-03

## 2022-11-03 PROBLEM — I05.9 MITRAL VALVE DISEASE: Status: ACTIVE | Noted: 2022-11-03

## 2022-11-03 PROBLEM — F41.8 MIXED ANXIETY DEPRESSIVE DISORDER: Status: ACTIVE | Noted: 2022-11-03

## 2022-11-04 ENCOUNTER — LAB VISIT (OUTPATIENT)
Dept: LAB | Facility: HOSPITAL | Age: 77
End: 2022-11-04
Attending: INTERNAL MEDICINE
Payer: MEDICARE

## 2022-11-04 DIAGNOSIS — Z17.0 MALIGNANT NEOPLASM OF UPPER-OUTER QUADRANT OF LEFT BREAST IN FEMALE, ESTROGEN RECEPTOR POSITIVE: ICD-10-CM

## 2022-11-04 DIAGNOSIS — C50.412 MALIGNANT NEOPLASM OF UPPER-OUTER QUADRANT OF LEFT BREAST IN FEMALE, ESTROGEN RECEPTOR POSITIVE: ICD-10-CM

## 2022-11-04 DIAGNOSIS — M85.851 OSTEOPENIA OF NECKS OF BOTH FEMURS: ICD-10-CM

## 2022-11-04 DIAGNOSIS — M85.852 OSTEOPENIA OF NECKS OF BOTH FEMURS: ICD-10-CM

## 2022-11-04 LAB
ALBUMIN SERPL-MCNC: 3.9 GM/DL (ref 3.4–4.8)
ALBUMIN/GLOB SERPL: 1.5 RATIO (ref 1.1–2)
ALP SERPL-CCNC: 59 UNIT/L (ref 40–150)
ALT SERPL-CCNC: 16 UNIT/L (ref 0–55)
AST SERPL-CCNC: 19 UNIT/L (ref 5–34)
BASOPHILS # BLD AUTO: 0.01 X10(3)/MCL (ref 0–0.2)
BASOPHILS NFR BLD AUTO: 0.3 %
BILIRUBIN DIRECT+TOT PNL SERPL-MCNC: 0.8 MG/DL
BUN SERPL-MCNC: 13.9 MG/DL (ref 9.8–20.1)
CALCIUM SERPL-MCNC: 9.2 MG/DL (ref 8.4–10.2)
CHLORIDE SERPL-SCNC: 103 MMOL/L (ref 98–107)
CO2 SERPL-SCNC: 26 MMOL/L (ref 23–31)
CREAT SERPL-MCNC: 0.72 MG/DL (ref 0.55–1.02)
EOSINOPHIL # BLD AUTO: 0.11 X10(3)/MCL (ref 0–0.9)
EOSINOPHIL NFR BLD AUTO: 2.8 %
ERYTHROCYTE [DISTWIDTH] IN BLOOD BY AUTOMATED COUNT: 13.2 % (ref 11.5–17)
GFR SERPLBLD CREATININE-BSD FMLA CKD-EPI: >60 MLS/MIN/1.73/M2
GLOBULIN SER-MCNC: 2.6 GM/DL (ref 2.4–3.5)
GLUCOSE SERPL-MCNC: 168 MG/DL (ref 82–115)
HCT VFR BLD AUTO: 41.8 % (ref 37–47)
HGB BLD-MCNC: 13.4 GM/DL (ref 12–16)
IMM GRANULOCYTES # BLD AUTO: 0 X10(3)/MCL (ref 0–0.04)
IMM GRANULOCYTES NFR BLD AUTO: 0 %
LYMPHOCYTES # BLD AUTO: 0.97 X10(3)/MCL (ref 0.6–4.6)
LYMPHOCYTES NFR BLD AUTO: 24.4 %
MCH RBC QN AUTO: 30 PG (ref 27–31)
MCHC RBC AUTO-ENTMCNC: 32.1 MG/DL (ref 33–36)
MCV RBC AUTO: 93.7 FL (ref 80–94)
MONOCYTES # BLD AUTO: 0.37 X10(3)/MCL (ref 0.1–1.3)
MONOCYTES NFR BLD AUTO: 9.3 %
NEUTROPHILS # BLD AUTO: 2.5 X10(3)/MCL (ref 2.1–9.2)
NEUTROPHILS NFR BLD AUTO: 63.2 %
PLATELET # BLD AUTO: 217 X10(3)/MCL (ref 130–400)
PMV BLD AUTO: 8.6 FL (ref 7.4–10.4)
POTASSIUM SERPL-SCNC: 3.8 MMOL/L (ref 3.5–5.1)
PROT SERPL-MCNC: 6.5 GM/DL (ref 5.8–7.6)
RBC # BLD AUTO: 4.46 X10(6)/MCL (ref 4.2–5.4)
SODIUM SERPL-SCNC: 142 MMOL/L (ref 136–145)
WBC # SPEC AUTO: 4 X10(3)/MCL (ref 4.5–11.5)

## 2022-11-04 PROCEDURE — 80053 COMPREHEN METABOLIC PANEL: CPT

## 2022-11-04 PROCEDURE — 36415 COLL VENOUS BLD VENIPUNCTURE: CPT

## 2022-11-04 PROCEDURE — 85025 COMPLETE CBC W/AUTO DIFF WBC: CPT

## 2022-11-09 ENCOUNTER — OFFICE VISIT (OUTPATIENT)
Dept: HEMATOLOGY/ONCOLOGY | Facility: CLINIC | Age: 77
End: 2022-11-09
Payer: MEDICARE

## 2022-11-09 ENCOUNTER — TELEPHONE (OUTPATIENT)
Dept: HEMATOLOGY/ONCOLOGY | Facility: CLINIC | Age: 77
End: 2022-11-09

## 2022-11-09 VITALS
WEIGHT: 138.19 LBS | HEART RATE: 67 BPM | SYSTOLIC BLOOD PRESSURE: 128 MMHG | DIASTOLIC BLOOD PRESSURE: 80 MMHG | OXYGEN SATURATION: 98 % | RESPIRATION RATE: 14 BRPM | BODY MASS INDEX: 24.48 KG/M2 | HEIGHT: 63 IN | TEMPERATURE: 98 F

## 2022-11-09 DIAGNOSIS — M85.852 OSTEOPENIA OF NECKS OF BOTH FEMURS: ICD-10-CM

## 2022-11-09 DIAGNOSIS — C50.412 MALIGNANT NEOPLASM OF UPPER-OUTER QUADRANT OF LEFT BREAST IN FEMALE, ESTROGEN RECEPTOR POSITIVE: Primary | ICD-10-CM

## 2022-11-09 DIAGNOSIS — Z17.0 MALIGNANT NEOPLASM OF UPPER-OUTER QUADRANT OF LEFT BREAST IN FEMALE, ESTROGEN RECEPTOR POSITIVE: Primary | ICD-10-CM

## 2022-11-09 DIAGNOSIS — M85.851 OSTEOPENIA OF NECKS OF BOTH FEMURS: ICD-10-CM

## 2022-11-09 PROCEDURE — 3074F PR MOST RECENT SYSTOLIC BLOOD PRESSURE < 130 MM HG: ICD-10-PCS | Mod: CPTII,S$GLB,, | Performed by: NURSE PRACTITIONER

## 2022-11-09 PROCEDURE — 1126F AMNT PAIN NOTED NONE PRSNT: CPT | Mod: CPTII,S$GLB,, | Performed by: NURSE PRACTITIONER

## 2022-11-09 PROCEDURE — 99999 PR PBB SHADOW E&M-EST. PATIENT-LVL IV: CPT | Mod: PBBFAC,,, | Performed by: NURSE PRACTITIONER

## 2022-11-09 PROCEDURE — 3079F PR MOST RECENT DIASTOLIC BLOOD PRESSURE 80-89 MM HG: ICD-10-PCS | Mod: CPTII,S$GLB,, | Performed by: NURSE PRACTITIONER

## 2022-11-09 PROCEDURE — 1159F PR MEDICATION LIST DOCUMENTED IN MEDICAL RECORD: ICD-10-PCS | Mod: CPTII,S$GLB,, | Performed by: NURSE PRACTITIONER

## 2022-11-09 PROCEDURE — 3074F SYST BP LT 130 MM HG: CPT | Mod: CPTII,S$GLB,, | Performed by: NURSE PRACTITIONER

## 2022-11-09 PROCEDURE — 99214 OFFICE O/P EST MOD 30 MIN: CPT | Mod: S$GLB,,, | Performed by: NURSE PRACTITIONER

## 2022-11-09 PROCEDURE — 1126F PR PAIN SEVERITY QUANTIFIED, NO PAIN PRESENT: ICD-10-PCS | Mod: CPTII,S$GLB,, | Performed by: NURSE PRACTITIONER

## 2022-11-09 PROCEDURE — 99214 PR OFFICE/OUTPT VISIT, EST, LEVL IV, 30-39 MIN: ICD-10-PCS | Mod: S$GLB,,, | Performed by: NURSE PRACTITIONER

## 2022-11-09 PROCEDURE — 1160F PR REVIEW ALL MEDS BY PRESCRIBER/CLIN PHARMACIST DOCUMENTED: ICD-10-PCS | Mod: CPTII,S$GLB,, | Performed by: NURSE PRACTITIONER

## 2022-11-09 PROCEDURE — 1160F RVW MEDS BY RX/DR IN RCRD: CPT | Mod: CPTII,S$GLB,, | Performed by: NURSE PRACTITIONER

## 2022-11-09 PROCEDURE — 3079F DIAST BP 80-89 MM HG: CPT | Mod: CPTII,S$GLB,, | Performed by: NURSE PRACTITIONER

## 2022-11-09 PROCEDURE — 99999 PR PBB SHADOW E&M-EST. PATIENT-LVL IV: ICD-10-PCS | Mod: PBBFAC,,, | Performed by: NURSE PRACTITIONER

## 2022-11-09 PROCEDURE — 1159F MED LIST DOCD IN RCRD: CPT | Mod: CPTII,S$GLB,, | Performed by: NURSE PRACTITIONER

## 2022-11-09 RX ORDER — DAPAGLIFLOZIN 10 MG/1
TABLET, FILM COATED ORAL
COMMUNITY
Start: 2022-02-23

## 2022-11-09 RX ORDER — DICLOFENAC SODIUM 75 MG/1
75 TABLET, DELAYED RELEASE ORAL 2 TIMES DAILY
COMMUNITY
Start: 2022-07-27

## 2022-11-09 RX ORDER — OFLOXACIN 3 MG/ML
SOLUTION/ DROPS OPHTHALMIC
COMMUNITY
Start: 2022-06-14

## 2022-11-16 ENCOUNTER — INFUSION (OUTPATIENT)
Dept: INFUSION THERAPY | Facility: HOSPITAL | Age: 77
End: 2022-11-16
Attending: FAMILY MEDICINE
Payer: MEDICARE

## 2022-11-16 VITALS
TEMPERATURE: 98 F | OXYGEN SATURATION: 100 % | HEART RATE: 74 BPM | DIASTOLIC BLOOD PRESSURE: 79 MMHG | SYSTOLIC BLOOD PRESSURE: 146 MMHG | RESPIRATION RATE: 16 BRPM

## 2022-11-16 DIAGNOSIS — M85.851 OSTEOPENIA OF NECKS OF BOTH FEMURS: Primary | ICD-10-CM

## 2022-11-16 DIAGNOSIS — M85.852 OSTEOPENIA OF NECKS OF BOTH FEMURS: Primary | ICD-10-CM

## 2022-11-16 PROCEDURE — 96372 THER/PROPH/DIAG INJ SC/IM: CPT

## 2022-11-16 PROCEDURE — 63600175 PHARM REV CODE 636 W HCPCS: Mod: JG | Performed by: NURSE PRACTITIONER

## 2022-11-16 RX ADMIN — DENOSUMAB 60 MG: 60 INJECTION SUBCUTANEOUS at 10:11

## 2022-11-16 NOTE — PLAN OF CARE
Problem: Adult Inpatient Plan of Care  Goal: Plan of Care Review  Outcome: Met  Flowsheets (Taken 11/16/2022 1050)  Plan of Care Reviewed With: patient  Goal: Absence of Hospital-Acquired Illness or Injury  Outcome: Met  Intervention: Identify and Manage Fall Risk  Flowsheets (Taken 11/16/2022 1050)  Safety Promotion/Fall Prevention:   assistive device/personal item within reach   in recliner, wheels locked   Fall Risk reviewed with patient/family     Pt tolerated first prolia injection well. Pt remained in clinic for 15 min shot time; NAD or pt c/o noted at the end of that time period. Next appt reviewed and pt denied questions or further needs at the time of discharge.

## 2023-02-15 ENCOUNTER — OFFICE VISIT (OUTPATIENT)
Dept: SURGICAL ONCOLOGY | Facility: CLINIC | Age: 78
End: 2023-02-15
Payer: MEDICARE

## 2023-02-15 VITALS
HEART RATE: 74 BPM | DIASTOLIC BLOOD PRESSURE: 69 MMHG | HEIGHT: 63 IN | SYSTOLIC BLOOD PRESSURE: 115 MMHG | WEIGHT: 141 LBS | BODY MASS INDEX: 24.98 KG/M2

## 2023-02-15 DIAGNOSIS — Z17.0 MALIGNANT NEOPLASM OF UPPER-OUTER QUADRANT OF LEFT BREAST IN FEMALE, ESTROGEN RECEPTOR POSITIVE: Primary | ICD-10-CM

## 2023-02-15 DIAGNOSIS — C50.412 MALIGNANT NEOPLASM OF UPPER-OUTER QUADRANT OF LEFT BREAST IN FEMALE, ESTROGEN RECEPTOR POSITIVE: Primary | ICD-10-CM

## 2023-02-15 DIAGNOSIS — Z85.3 HX OF BREAST CANCER: Primary | ICD-10-CM

## 2023-02-15 PROCEDURE — 3074F PR MOST RECENT SYSTOLIC BLOOD PRESSURE < 130 MM HG: ICD-10-PCS | Mod: CPTII,S$GLB,, | Performed by: SURGERY

## 2023-02-15 PROCEDURE — 1101F PR PT FALLS ASSESS DOC 0-1 FALLS W/OUT INJ PAST YR: ICD-10-PCS | Mod: CPTII,S$GLB,, | Performed by: SURGERY

## 2023-02-15 PROCEDURE — 3078F DIAST BP <80 MM HG: CPT | Mod: CPTII,S$GLB,, | Performed by: SURGERY

## 2023-02-15 PROCEDURE — 99214 PR OFFICE/OUTPT VISIT, EST, LEVL IV, 30-39 MIN: ICD-10-PCS | Mod: S$GLB,,, | Performed by: SURGERY

## 2023-02-15 PROCEDURE — 1101F PT FALLS ASSESS-DOCD LE1/YR: CPT | Mod: CPTII,S$GLB,, | Performed by: SURGERY

## 2023-02-15 PROCEDURE — 3074F SYST BP LT 130 MM HG: CPT | Mod: CPTII,S$GLB,, | Performed by: SURGERY

## 2023-02-15 PROCEDURE — 3288F FALL RISK ASSESSMENT DOCD: CPT | Mod: CPTII,S$GLB,, | Performed by: SURGERY

## 2023-02-15 PROCEDURE — 99214 OFFICE O/P EST MOD 30 MIN: CPT | Mod: S$GLB,,, | Performed by: SURGERY

## 2023-02-15 PROCEDURE — 3288F PR FALLS RISK ASSESSMENT DOCUMENTED: ICD-10-PCS | Mod: CPTII,S$GLB,, | Performed by: SURGERY

## 2023-02-15 PROCEDURE — 1159F MED LIST DOCD IN RCRD: CPT | Mod: CPTII,S$GLB,, | Performed by: SURGERY

## 2023-02-15 PROCEDURE — 3078F PR MOST RECENT DIASTOLIC BLOOD PRESSURE < 80 MM HG: ICD-10-PCS | Mod: CPTII,S$GLB,, | Performed by: SURGERY

## 2023-02-15 PROCEDURE — 99999 PR PBB SHADOW E&M-EST. PATIENT-LVL III: ICD-10-PCS | Mod: PBBFAC,,, | Performed by: SURGERY

## 2023-02-15 PROCEDURE — 1159F PR MEDICATION LIST DOCUMENTED IN MEDICAL RECORD: ICD-10-PCS | Mod: CPTII,S$GLB,, | Performed by: SURGERY

## 2023-02-15 PROCEDURE — 99999 PR PBB SHADOW E&M-EST. PATIENT-LVL III: CPT | Mod: PBBFAC,,, | Performed by: SURGERY

## 2023-02-15 NOTE — PROGRESS NOTES
Chief complaint:  Breast cancer follow-up     HPI: 77 year-old-female referred by Dr. Wendy Siddiqui who underwent routine screening mammogram showing calcifications in both breast and an asymmetry in the outer left breast, 2 o'clock position, 4 cm from the nipple.. Diagnostic imaging showed a left breast lobulated mass measuring 1cm. Biopsy was performed, pathology showing invasive ductal carcinoma. Breast receptor studies are pending. The patient reports remote history of a benign breast biopsy in the past. She reports no family history of breast or ovarian cancer.    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  She completed adjuvant radiation therapy.  She discontinued Femara due to side effects.  I reviewed medical oncology progress notes.    She denies breast masses, skin changes, nipple inversion or discharge on self-breast exam.    Recent surveillance mammography was reviewed, I personally interpreted the images and reviewed the report.  I discussed with the patient in detail and questions were addressed.  There are no suspicious findings.    Greater than 30 minutes was required for complete chart review, imaging review patient consultation and documentation            Past Medical and Surgical History  Allergies :   Patient has no known allergies.    [email or web address]  Medical :   She has a past medical history of Anxiety.    Surgical :   She has a past surgical history that includes Hysterectomy; Shoulder surgery; Cholecystectomy; Knee surgery; and LUMPECTOMY,BREAST,WITH RADIOACTIVE SEED LOCALIZATION AND SENTINEL LYMPH NODE BIOPSY (Left).     Family History  Her family history includes Cancer in her father; Diabetes in her brother.    Social History  She reports that she has never smoked. She  has never used smokeless tobacco. She reports that she does not currently use alcohol. She reports that she does not use drugs.     Review of Systems   Constitutional:  Negative for appetite change, chills, diaphoresis and fever.   HENT:  Negative for congestion, drooling, ear discharge, ear pain and hearing loss.    Eyes:  Negative for discharge.   Respiratory:  Negative for apnea, cough, choking, chest tightness, shortness of breath and stridor.    Cardiovascular:  Negative for chest pain, palpitations and leg swelling.   Endocrine: Negative for cold intolerance and heat intolerance.   Genitourinary:  Negative for difficulty urinating, dyspareunia, dysuria and hematuria.   Musculoskeletal:  Negative for arthralgias, gait problem and joint swelling.   Skin:  Negative for color change and rash.   Neurological:  Negative for dizziness, tremors, seizures, syncope, facial asymmetry, speech difficulty, light-headedness, numbness and headaches.   Psychiatric/Behavioral:  Negative for agitation and confusion.       Objective   Physical Exam  Vitals and nursing note reviewed.   Constitutional:       General: She is not in acute distress.     Appearance: Normal appearance. She is normal weight. She is not ill-appearing, toxic-appearing or diaphoretic.   HENT:      Head: Normocephalic and atraumatic.      Right Ear: External ear normal.      Left Ear: External ear normal.      Nose: Nose normal.      Mouth/Throat:      Mouth: Mucous membranes are moist.      Pharynx: Oropharynx is clear.   Eyes:      General: No scleral icterus.     Extraocular Movements: Extraocular movements intact.      Conjunctiva/sclera: Conjunctivae normal.      Pupils: Pupils are equal, round, and reactive to light.   Cardiovascular:      Rate and Rhythm: Normal rate and regular rhythm.      Pulses: Normal pulses.      Heart sounds: Normal heart sounds. No murmur heard.    No friction rub. No gallop.   Pulmonary:      Effort: Pulmonary effort is  normal. No respiratory distress.      Breath sounds: Normal breath sounds. No stridor. No wheezing or rhonchi.   Chest:      Chest wall: No tenderness.   Breasts:     Right: No swelling, bleeding, inverted nipple, mass, nipple discharge, skin change or tenderness.      Left: No swelling, bleeding, inverted nipple, mass, nipple discharge, skin change or tenderness.   Abdominal:      General: Abdomen is flat. There is no distension.      Palpations: Abdomen is soft. There is no mass.      Tenderness: There is no abdominal tenderness. There is no right CVA tenderness, left CVA tenderness, guarding or rebound.      Hernia: No hernia is present.   Musculoskeletal:         General: No swelling, tenderness, deformity or signs of injury. Normal range of motion.      Cervical back: Normal range of motion and neck supple. No rigidity or tenderness.      Right lower leg: No edema.      Left lower leg: No edema.   Lymphadenopathy:      Cervical: No cervical adenopathy.      Upper Body:      Right upper body: No supraclavicular or axillary adenopathy.      Left upper body: No supraclavicular or axillary adenopathy.   Skin:     General: Skin is warm.      Capillary Refill: Capillary refill takes less than 2 seconds.      Coloration: Skin is not jaundiced or pale.      Findings: No bruising, erythema, lesion or rash.   Neurological:      General: No focal deficit present.      Mental Status: She is alert and oriented to person, place, and time. Mental status is at baseline.      Cranial Nerves: No cranial nerve deficit.      Sensory: No sensory deficit.      Motor: No weakness.      Coordination: Coordination normal.      Gait: Gait normal.   Psychiatric:         Mood and Affect: Mood normal.         Behavior: Behavior normal.         Thought Content: Thought content normal.         Judgment: Judgment normal.     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

## 2023-02-23 NOTE — PROGRESS NOTES
Subjective:       Patient ID: Nadja Monae is a 77 y.o. female.    Referring physician: Dr. Brandon Samnao  Reason for Referral: Breast Cancer    Left Breast Cancer Stage IA ([id number])--Diagnosed 22  Biopsy/pathology:  Left breast biopsy done 22--2:00 4CMFN core biopsies positive for invasive ductal carcinoma, mucinous type, intermediate grade, measuring at least 0.7cm, DCIS, cribriform pattern, intermediate grade, w/o necrosis, %, MS 87%, Her2 1+ by IHC, negative by FISH, Ki67 5.3%.  Surgery/pathology: Left breast lumpectomy with SLN biopsy done 3/21/22--invasive mucinous carcinoma, well differentiated, Grade 1, measuring 0.9cm, clear margins, 4 negative SLNs.   Imagin. Screening Oscar MMG bilateral done at [facility] 22--an asymmetry anterior UOQ of left breast, measures 1.3cm, needs additional imaging, BIRADS 0.  2. Diagnostic MMG/US done at [facility] 2/15/22--lobulated mass in left breast at 2:00 4CMFN, measures 0.9X0.5X1cm, suspicious by US, focal asymmetry in UOQ of left breast does persist on MMG, BIRADS 4, biopsy recommeded.     [facility] DEXA 22--AP spine T-score 0.4, Total hip left -1.0, right -1.0, Femur left -1.9, right -1.7 c/w osteopoenia bilateral femurs.     Treatment history:  Left breast lumpectomy and SLN biopsy 3/21/22.  Adjuvant radiation completed 22.    Current treatment plan:  Femara X 5 years. Started on 6/15/22.  Prolia every 6 months. Started on 22. Next dose in May 2023.    Chief Complaint: Other Misc (Pt reports that she does not want to take inj anymore. Reports hair loss, nails breaking, hands spasming. Pt states that she is due for inj in May and would like to discuss alternative tx.)    HPI   Patient presents for follow-up of breast cancer. She started Femara in 2022. Initially, she had numerous complaints -  nausea, dizziness, diarrhea, hot flashes and fogginess. We were not sure if all her complaints were from the Femara so it was held. Symptoms continued  after holding so she restarted the Femara. She does continue with GI issues - intermittent diarrhea and constipation. Recent colonoscopy good. Bilateral MMG on 2/13/23 at [facility] benign. Her biggest complaint today is hair loss and nail changes. She is convinced this is from the Prolia but I clarified that this is likely from the Femara. I did discuss several different options for her to try. Also mentions left hand with fingers locking at times but this is not bothersome. She did agree to continue on the Prolia for now. No other problems reported.      Past Medical History:   Diagnosis Date    Anxiety       Review of patient's allergies indicates:  No Known Allergies   Current Outpatient Medications on File Prior to Visit   Medication Sig Dispense Refill    allopurinoL (ZYLOPRIM) 300 MG tablet Take 300 mg by mouth once daily.      dapagliflozin (FARXIGA) 10 mg tablet   0 Refill(s)      dulaglutide (TRULICITY) 1.5 mg/0.5 mL pen injector Inject 1.5 mg into the skin every 7 days.      EScitalopram oxalate (LEXAPRO) 20 MG tablet Take 20 mg by mouth once daily.      glimepiride (AMARYL) 4 MG tablet Take 4 mg by mouth once daily.      isosorbide mononitrate (IMDUR) 30 MG 24 hr tablet Take 15 mg by mouth once daily.      metFORMIN (GLUCOPHAGE) 1000 MG tablet Take 1,000 mg by mouth 2 (two) times daily.      ONETOUCH DELICA PLUS LANCET 33 gauge Misc Apply topically.      ONETOUCH ULTRA TEST Strp 2 (two) times a day. Test      ONETOUCH ULTRA2 METER Misc SMARTSIG:Via Meter Daily      pioglitazone (ACTOS) 30 MG tablet Take 15 mg by mouth once daily.      rosuvastatin (CRESTOR) 20 MG tablet Take 5 mg by mouth Daily.      diclofenac (VOLTAREN) 75 MG EC tablet Take 75 mg by mouth 2 (two) times daily.      ofloxacin (OCUFLOX) 0.3 % ophthalmic solution SMARTSIG:In Ear(s)       No current facility-administered medications on file prior to visit.      Review of Systems   Constitutional:  Negative for appetite change and fever.   HENT:   Negative for mouth sores.    Eyes: Negative.    Respiratory:  Negative for cough and shortness of breath.    Cardiovascular:  Negative for chest pain and leg swelling.   Gastrointestinal:  Positive for constipation and diarrhea. Negative for abdominal distention, abdominal pain, vomiting and reflux.   Endocrine:        Hot flashes, hair thinning and loss   Genitourinary:  Negative for difficulty urinating, dysuria and hematuria.   Musculoskeletal:  Negative for arthralgias and back pain.   Integumentary:  Negative for rash.   Neurological:  Negative for weakness and headaches.   Hematological:  Negative for adenopathy.   Psychiatric/Behavioral:  Negative for sleep disturbance. The patient is not nervous/anxious.        Vitals:    03/01/23 0918   BP: 130/79   Pulse: 70   Resp: 14   Temp: 98.1 °F (36.7 °C)      Physical Exam  Constitutional:       Appearance: Normal appearance. She is normal weight.   HENT:      Head: Normocephalic.      Nose: Nose normal.      Mouth/Throat:      Mouth: Mucous membranes are moist.   Eyes:      Extraocular Movements: Extraocular movements intact.      Conjunctiva/sclera: Conjunctivae normal.   Cardiovascular:      Rate and Rhythm: Normal rate and regular rhythm.      Heart sounds: Normal heart sounds.   Pulmonary:      Effort: Pulmonary effort is normal.      Breath sounds: Normal breath sounds.   Chest:   Breasts:     Right: Normal.      Comments: Left breast with incision outer quadrant, left axillary incision, no palpable mass in either breast and no adenopathy  Abdominal:      General: Abdomen is flat. Bowel sounds are normal. There is no distension.      Palpations: Abdomen is soft.      Tenderness: There is no abdominal tenderness.   Musculoskeletal:         General: Normal range of motion.      Right lower leg: No edema.      Left lower leg: No edema.   Skin:     General: Skin is warm and dry.   Neurological:      General: No focal deficit present.      Mental Status: She is  alert and oriented to person, place, and time.   Psychiatric:         Attention and Perception: Attention normal.         Mood and Affect: Mood normal.         Speech: Speech normal.         Behavior: Behavior is cooperative.         Judgment: Judgment normal.     No visits with results within 1 Day(s) from this visit.   Latest known visit with results is:   Lab Visit on 02/24/2023   Component Date Value Ref Range Status    Sodium Level 02/24/2023 139  136 - 145 mmol/L Final    Potassium Level 02/24/2023 4.0  3.5 - 5.1 mmol/L Final    Chloride 02/24/2023 103  98 - 107 mmol/L Final    Carbon Dioxide 02/24/2023 30  23 - 31 mmol/L Final    Glucose Level 02/24/2023 100  82 - 115 mg/dL Final    Blood Urea Nitrogen 02/24/2023 17.7  9.8 - 20.1 mg/dL Final    Creatinine 02/24/2023 0.67  0.55 - 1.02 mg/dL Final    Calcium Level Total 02/24/2023 9.6  8.4 - 10.2 mg/dL Final    Protein Total 02/24/2023 7.0  5.8 - 7.6 gm/dL Final    Albumin Level 02/24/2023 4.3  3.4 - 4.8 g/dL Final    Globulin 02/24/2023 2.7  2.4 - 3.5 gm/dL Final    Albumin/Globulin Ratio 02/24/2023 1.6  1.1 - 2.0 ratio Final    Bilirubin Total 02/24/2023 1.1  <=1.5 mg/dL Final    Alkaline Phosphatase 02/24/2023 39 (L)  40 - 150 unit/L Final    Alanine Aminotransferase 02/24/2023 17  0 - 55 unit/L Final    Aspartate Aminotransferase 02/24/2023 19  5 - 34 unit/L Final    eGFR 02/24/2023 >60  mls/min/1.73/m2 Final    WBC 02/24/2023 4.8  4.5 - 11.5 x10(3)/mcL Final    RBC 02/24/2023 4.73  4.20 - 5.40 x10(6)/mcL Final    Hgb 02/24/2023 14.0  12.0 - 16.0 g/dL Final    Hct 02/24/2023 43.8  37.0 - 47.0 % Final    MCV 02/24/2023 92.6  80.0 - 94.0 fL Final    MCH 02/24/2023 29.6  pg Final    MCHC 02/24/2023 32.0 (L)  33.0 - 36.0 g/dL Final    RDW 02/24/2023 12.7  11.5 - 17.0 % Final    Platelet 02/24/2023 233  130 - 400 x10(3)/mcL Final    MPV 02/24/2023 8.8  7.4 - 10.4 fL Final    Neut % 02/24/2023 67.1  % Final    Lymph % 02/24/2023 22.6  % Final    Mono % 02/24/2023  8.9  % Final    Eos % 02/24/2023 1.0  % Final    Basophil % 02/24/2023 0.4  % Final    Lymph # 02/24/2023 1.09  0.6 - 4.6 x10(3)/mcL Final    Neut # 02/24/2023 3.24  2.1 - 9.2 x10(3)/mcL Final    Mono # 02/24/2023 0.43  0.1 - 1.3 x10(3)/mcL Final    Eos # 02/24/2023 0.05  0 - 0.9 x10(3)/mcL Final    Baso # 02/24/2023 0.02  0 - 0.2 x10(3)/mcL Final    IG# 02/24/2023 0.00  0 - 0.04 x10(3)/mcL Final    IG% 02/24/2023 0.0  % Final       Assessment:       1. Malignant neoplasm of upper-outer quadrant of left breast in female, estrogen receptor positive    2. Osteopenia of necks of both femurs            Plan:    Patient with Stage IA breast cancer ([id number]). S/p lumpectomy on 3/21/22. Tumor measured 9mm, Grade 1, invasive mucinous carcinoma, margins clear with 4 negative SLNs, strongly ER and IN positive and Her2 negative, with low Ki67.  Due to favorable histology, per NCCN, no chemotherapy recommended.  Treatment recommended with AI X 5 years.  Patient completed adjuvant radiation on 6/1/22.    Currently patient is without any clinical symptoms or laboratory evidence of recurrence.   Started Femara on 6/15/22. Initially, she had numerous complaints -  nausea, dizziness, diarrhea, hot flashes and fogginess. We were not sure if all her complaints were from the Femara so it was held. Symptoms continued after holding so she restarted the Femara.   Mentions her symptoms are better.   Recent labs all good.  Baseline DEXA in February revealed osteopenia. We discussed different options and she declined Prolia initially. However, given her current GI symptoms she agreed to start Prolia. Started November 2022.   She complains of hair loss/thinning and left hand/fingers locking up at times. These symptoms are a result of the Femara and this was discussed with her today. I did give her a list of options to try for the hair loss today. She has agreed to continue the Femara and the Prolia for now.   Continue Caltrate + D twice  daily.   Bilateral MMG on 2/13/23 at [facility] benign. Recommend to repeat in 1 year.   Continue surveillance visits every 4 months.     All questions answered at this time.      KEVIN AbelPC

## 2023-02-24 ENCOUNTER — LAB VISIT (OUTPATIENT)
Dept: LAB | Facility: HOSPITAL | Age: 78
End: 2023-02-24
Attending: INTERNAL MEDICINE
Payer: MEDICARE

## 2023-02-24 DIAGNOSIS — M85.852 OSTEOPENIA OF NECKS OF BOTH FEMURS: ICD-10-CM

## 2023-02-24 DIAGNOSIS — M85.851 OSTEOPENIA OF NECKS OF BOTH FEMURS: ICD-10-CM

## 2023-02-24 DIAGNOSIS — C50.412 MALIGNANT NEOPLASM OF UPPER-OUTER QUADRANT OF LEFT BREAST IN FEMALE, ESTROGEN RECEPTOR POSITIVE: ICD-10-CM

## 2023-02-24 DIAGNOSIS — Z17.0 MALIGNANT NEOPLASM OF UPPER-OUTER QUADRANT OF LEFT BREAST IN FEMALE, ESTROGEN RECEPTOR POSITIVE: ICD-10-CM

## 2023-02-24 LAB
ALBUMIN SERPL-MCNC: 4.3 G/DL (ref 3.4–4.8)
ALBUMIN/GLOB SERPL: 1.6 RATIO (ref 1.1–2)
ALP SERPL-CCNC: 39 UNIT/L (ref 40–150)
ALT SERPL-CCNC: 17 UNIT/L (ref 0–55)
AST SERPL-CCNC: 19 UNIT/L (ref 5–34)
BASOPHILS # BLD AUTO: 0.02 X10(3)/MCL (ref 0–0.2)
BASOPHILS NFR BLD AUTO: 0.4 %
BILIRUBIN DIRECT+TOT PNL SERPL-MCNC: 1.1 MG/DL
BUN SERPL-MCNC: 17.7 MG/DL (ref 9.8–20.1)
CALCIUM SERPL-MCNC: 9.6 MG/DL (ref 8.4–10.2)
CHLORIDE SERPL-SCNC: 103 MMOL/L (ref 98–107)
CO2 SERPL-SCNC: 30 MMOL/L (ref 23–31)
CREAT SERPL-MCNC: 0.67 MG/DL (ref 0.55–1.02)
EOSINOPHIL # BLD AUTO: 0.05 X10(3)/MCL (ref 0–0.9)
EOSINOPHIL NFR BLD AUTO: 1 %
ERYTHROCYTE [DISTWIDTH] IN BLOOD BY AUTOMATED COUNT: 12.7 % (ref 11.5–17)
GFR SERPLBLD CREATININE-BSD FMLA CKD-EPI: >60 MLS/MIN/1.73/M2
GLOBULIN SER-MCNC: 2.7 GM/DL (ref 2.4–3.5)
GLUCOSE SERPL-MCNC: 100 MG/DL (ref 82–115)
HCT VFR BLD AUTO: 43.8 % (ref 37–47)
HGB BLD-MCNC: 14 G/DL (ref 12–16)
IMM GRANULOCYTES # BLD AUTO: 0 X10(3)/MCL (ref 0–0.04)
IMM GRANULOCYTES NFR BLD AUTO: 0 %
LYMPHOCYTES # BLD AUTO: 1.09 X10(3)/MCL (ref 0.6–4.6)
LYMPHOCYTES NFR BLD AUTO: 22.6 %
MCH RBC QN AUTO: 29.6 PG
MCHC RBC AUTO-ENTMCNC: 32 G/DL (ref 33–36)
MCV RBC AUTO: 92.6 FL (ref 80–94)
MONOCYTES # BLD AUTO: 0.43 X10(3)/MCL (ref 0.1–1.3)
MONOCYTES NFR BLD AUTO: 8.9 %
NEUTROPHILS # BLD AUTO: 3.24 X10(3)/MCL (ref 2.1–9.2)
NEUTROPHILS NFR BLD AUTO: 67.1 %
PLATELET # BLD AUTO: 233 X10(3)/MCL (ref 130–400)
PMV BLD AUTO: 8.8 FL (ref 7.4–10.4)
POTASSIUM SERPL-SCNC: 4 MMOL/L (ref 3.5–5.1)
PROT SERPL-MCNC: 7 GM/DL (ref 5.8–7.6)
RBC # BLD AUTO: 4.73 X10(6)/MCL (ref 4.2–5.4)
SODIUM SERPL-SCNC: 139 MMOL/L (ref 136–145)
WBC # SPEC AUTO: 4.8 X10(3)/MCL (ref 4.5–11.5)

## 2023-02-24 PROCEDURE — 80053 COMPREHEN METABOLIC PANEL: CPT

## 2023-02-24 PROCEDURE — 36415 COLL VENOUS BLD VENIPUNCTURE: CPT

## 2023-02-24 PROCEDURE — 85025 COMPLETE CBC W/AUTO DIFF WBC: CPT

## 2023-03-01 ENCOUNTER — TELEPHONE (OUTPATIENT)
Dept: HEMATOLOGY/ONCOLOGY | Facility: CLINIC | Age: 78
End: 2023-03-01

## 2023-03-01 ENCOUNTER — OFFICE VISIT (OUTPATIENT)
Dept: HEMATOLOGY/ONCOLOGY | Facility: CLINIC | Age: 78
End: 2023-03-01
Payer: MEDICARE

## 2023-03-01 VITALS
BODY MASS INDEX: 24.83 KG/M2 | RESPIRATION RATE: 14 BRPM | SYSTOLIC BLOOD PRESSURE: 130 MMHG | HEIGHT: 63 IN | OXYGEN SATURATION: 98 % | DIASTOLIC BLOOD PRESSURE: 79 MMHG | HEART RATE: 70 BPM | TEMPERATURE: 98 F | WEIGHT: 140.13 LBS

## 2023-03-01 DIAGNOSIS — M85.852 OSTEOPENIA OF NECKS OF BOTH FEMURS: ICD-10-CM

## 2023-03-01 DIAGNOSIS — C50.412 MALIGNANT NEOPLASM OF UPPER-OUTER QUADRANT OF LEFT BREAST IN FEMALE, ESTROGEN RECEPTOR POSITIVE: Primary | ICD-10-CM

## 2023-03-01 DIAGNOSIS — M85.851 OSTEOPENIA OF NECKS OF BOTH FEMURS: ICD-10-CM

## 2023-03-01 DIAGNOSIS — Z17.0 MALIGNANT NEOPLASM OF UPPER-OUTER QUADRANT OF LEFT BREAST IN FEMALE, ESTROGEN RECEPTOR POSITIVE: Primary | ICD-10-CM

## 2023-03-01 PROCEDURE — 3078F DIAST BP <80 MM HG: CPT | Mod: CPTII,S$GLB,, | Performed by: NURSE PRACTITIONER

## 2023-03-01 PROCEDURE — 1126F AMNT PAIN NOTED NONE PRSNT: CPT | Mod: CPTII,S$GLB,, | Performed by: NURSE PRACTITIONER

## 2023-03-01 PROCEDURE — 99999 PR PBB SHADOW E&M-EST. PATIENT-LVL IV: CPT | Mod: PBBFAC,,, | Performed by: NURSE PRACTITIONER

## 2023-03-01 PROCEDURE — 99214 OFFICE O/P EST MOD 30 MIN: CPT | Mod: S$GLB,,, | Performed by: NURSE PRACTITIONER

## 2023-03-01 PROCEDURE — 1126F PR PAIN SEVERITY QUANTIFIED, NO PAIN PRESENT: ICD-10-PCS | Mod: CPTII,S$GLB,, | Performed by: NURSE PRACTITIONER

## 2023-03-01 PROCEDURE — 1159F MED LIST DOCD IN RCRD: CPT | Mod: CPTII,S$GLB,, | Performed by: NURSE PRACTITIONER

## 2023-03-01 PROCEDURE — 3075F PR MOST RECENT SYSTOLIC BLOOD PRESS GE 130-139MM HG: ICD-10-PCS | Mod: CPTII,S$GLB,, | Performed by: NURSE PRACTITIONER

## 2023-03-01 PROCEDURE — 3288F FALL RISK ASSESSMENT DOCD: CPT | Mod: CPTII,S$GLB,, | Performed by: NURSE PRACTITIONER

## 2023-03-01 PROCEDURE — 3078F PR MOST RECENT DIASTOLIC BLOOD PRESSURE < 80 MM HG: ICD-10-PCS | Mod: CPTII,S$GLB,, | Performed by: NURSE PRACTITIONER

## 2023-03-01 PROCEDURE — 3288F PR FALLS RISK ASSESSMENT DOCUMENTED: ICD-10-PCS | Mod: CPTII,S$GLB,, | Performed by: NURSE PRACTITIONER

## 2023-03-01 PROCEDURE — 1101F PR PT FALLS ASSESS DOC 0-1 FALLS W/OUT INJ PAST YR: ICD-10-PCS | Mod: CPTII,S$GLB,, | Performed by: NURSE PRACTITIONER

## 2023-03-01 PROCEDURE — 1160F PR REVIEW ALL MEDS BY PRESCRIBER/CLIN PHARMACIST DOCUMENTED: ICD-10-PCS | Mod: CPTII,S$GLB,, | Performed by: NURSE PRACTITIONER

## 2023-03-01 PROCEDURE — 1101F PT FALLS ASSESS-DOCD LE1/YR: CPT | Mod: CPTII,S$GLB,, | Performed by: NURSE PRACTITIONER

## 2023-03-01 PROCEDURE — 1160F RVW MEDS BY RX/DR IN RCRD: CPT | Mod: CPTII,S$GLB,, | Performed by: NURSE PRACTITIONER

## 2023-03-01 PROCEDURE — 3075F SYST BP GE 130 - 139MM HG: CPT | Mod: CPTII,S$GLB,, | Performed by: NURSE PRACTITIONER

## 2023-03-01 PROCEDURE — 1159F PR MEDICATION LIST DOCUMENTED IN MEDICAL RECORD: ICD-10-PCS | Mod: CPTII,S$GLB,, | Performed by: NURSE PRACTITIONER

## 2023-03-01 PROCEDURE — 99999 PR PBB SHADOW E&M-EST. PATIENT-LVL IV: ICD-10-PCS | Mod: PBBFAC,,, | Performed by: NURSE PRACTITIONER

## 2023-03-01 PROCEDURE — 99214 PR OFFICE/OUTPT VISIT, EST, LEVL IV, 30-39 MIN: ICD-10-PCS | Mod: S$GLB,,, | Performed by: NURSE PRACTITIONER

## 2023-03-01 RX ORDER — LANCETS 30 GAUGE
EACH MISCELLANEOUS
COMMUNITY
Start: 2023-01-23

## 2023-03-01 RX ORDER — LANCETS 33 GAUGE
EACH MISCELLANEOUS
COMMUNITY
Start: 2023-01-23

## 2023-05-24 ENCOUNTER — INFUSION (OUTPATIENT)
Dept: INFUSION THERAPY | Facility: HOSPITAL | Age: 78
End: 2023-05-24
Attending: NURSE PRACTITIONER
Payer: MEDICARE

## 2023-05-24 VITALS
HEART RATE: 75 BPM | DIASTOLIC BLOOD PRESSURE: 7 MMHG | SYSTOLIC BLOOD PRESSURE: 113 MMHG | RESPIRATION RATE: 16 BRPM | TEMPERATURE: 98 F | OXYGEN SATURATION: 97 %

## 2023-05-24 DIAGNOSIS — M85.851 OSTEOPENIA OF NECKS OF BOTH FEMURS: Primary | ICD-10-CM

## 2023-05-24 DIAGNOSIS — M85.852 OSTEOPENIA OF NECKS OF BOTH FEMURS: Primary | ICD-10-CM

## 2023-05-24 PROCEDURE — 63600175 PHARM REV CODE 636 W HCPCS: Mod: JZ,JG | Performed by: NURSE PRACTITIONER

## 2023-05-24 PROCEDURE — 96372 THER/PROPH/DIAG INJ SC/IM: CPT

## 2023-05-24 RX ADMIN — DENOSUMAB 60 MG: 60 INJECTION SUBCUTANEOUS at 08:05

## 2023-07-05 NOTE — PROGRESS NOTES
Subjective:       Patient ID: Nadja Monae is a 77 y.o. female.    Referring physician: Dr. Brandon Samano  Reason for Referral: Breast Cancer    Left Breast Cancer Stage IA ([id number])--Diagnosed 22  Biopsy/pathology:  Left breast biopsy done 22--2:00 4CMFN core biopsies positive for invasive ductal carcinoma, mucinous type, intermediate grade, measuring at least 0.7cm, DCIS, cribriform pattern, intermediate grade, w/o necrosis, %, W[place] 87%, Her2 1+ by IHC, negative by FISH, Ki67 5.3%.  Surgery/pathology: Left breast lumpectomy with SLN biopsy done 3/21/22--invasive mucinous carcinoma, well differentiated, Grade 1, measuring 0.9cm, clear margins, 4 negative SLNs.   Imagin. Screening Oscar MMG bilateral done at [facility] 22--an asymmetry anterior UOQ of left breast, measures 1.3cm, needs additional imaging, BIRADS 0.  2. Diagnostic MMG/US done at [facility] 2/15/22--lobulated mass in left breast at 2:00 4CMFN, measures 0.9X0.5X1cm, suspicious by US, focal asymmetry in UOQ of left breast does persist on MMG, BIRADS 4, biopsy recommeded.     [facility] DEXA 22--AP spine T-score 0.4, Total hip left -1.0, right -1.0, Femur left -1.9, right -1.7 c/w osteopoenia bilateral femurs.     Treatment history:  Left breast lumpectomy and SLN biopsy 3/21/22.  Adjuvant radiation completed 22.    Current treatment plan:  Femara X 5 years. Started on 6/15/22. Stopped ? 2023.   Plan to start Arimidex on 23.   Prolia every 6 months. Started on 22. Next dose in 2023.    Chief Complaint: Other Misc (Pt reports no new concerns today.)    HPI   Patient presents for follow-up of breast cancer. She started Femara in 2022. Initially, she had numerous complaints -  nausea, dizziness, diarrhea, hot flashes and fogginess. We were not sure if all her complaints were from the Femara so it was held. Symptoms continued after holding so was instructed to restart Femara but tells me today she has been off for  some time. She does continue with GI issues - intermittent diarrhea and constipation. Recent colonoscopy good. Bilateral MMG on 2/13/23 at [facility] benign. We discussed the importance of restarting hormonal blockade and she agreed. She would like to try a different option - Arimidex. She was told to start today. No other problems reported.      Past Medical History:   Diagnosis Date    Anxiety       Review of patient's allergies indicates:  No Known Allergies   Current Outpatient Medications on File Prior to Visit   Medication Sig Dispense Refill    allopurinoL (ZYLOPRIM) 300 MG tablet Take 300 mg by mouth once daily.      dapagliflozin (FARXIGA) 10 mg tablet   0 Refill(s)      dulaglutide (TRULICITY) 1.5 mg/0.5 mL pen injector Inject 1.5 mg into the skin every 7 days.      EScitalopram oxalate (LEXAPRO) 20 MG tablet Take 20 mg by mouth once daily.      glimepiride (AMARYL) 4 MG tablet Take 4 mg by mouth once daily.      metFORMIN (GLUCOPHAGE) 1000 MG tablet Take 1,000 mg by mouth 2 (two) times daily.      ONETOUCH DELICA PLUS LANCET 33 gauge Misc Apply topically.      ONETOUCH ULTRA TEST Strp 2 (two) times a day. Test      ONETOUCH ULTRA2 METER Misc SMARTSIG:Via Meter Daily      pioglitazone (ACTOS) 30 MG tablet Take 15 mg by mouth once daily.      rosuvastatin (CRESTOR) 20 MG tablet Take 5 mg by mouth Daily.      diclofenac (VOLTAREN) 75 MG EC tablet Take 75 mg by mouth 2 (two) times daily.      isosorbide mononitrate (IMDUR) 30 MG 24 hr tablet Take 15 mg by mouth once daily.      ofloxacin (OCUFLOX) 0.3 % ophthalmic solution SMARTSIG:In Ear(s)       No current facility-administered medications on file prior to visit.      Review of Systems   Constitutional:  Negative for appetite change and fever.   HENT:  Negative for mouth sores.    Eyes: Negative.    Respiratory:  Negative for cough and shortness of breath.    Cardiovascular:  Negative for chest pain and leg swelling.   Gastrointestinal:  Positive for constipation  and diarrhea. Negative for abdominal distention, abdominal pain, vomiting and reflux.   Genitourinary:  Negative for difficulty urinating, dysuria and hematuria.   Musculoskeletal:  Negative for arthralgias and back pain.   Integumentary:  Negative for rash.   Neurological:  Negative for weakness and headaches.   Hematological:  Negative for adenopathy.   Psychiatric/Behavioral:  Negative for sleep disturbance. The patient is not nervous/anxious.        Vitals:    07/19/23 0912   BP: 131/70   Pulse: 67   Resp: 14   Temp: 98.2 °F (36.8 °C)      Physical Exam  Constitutional:       Appearance: Normal appearance. She is normal weight.   HENT:      Head: Normocephalic.      Nose: Nose normal.      Mouth/Throat:      Mouth: Mucous membranes are moist.   Eyes:      Extraocular Movements: Extraocular movements intact.      Conjunctiva/sclera: Conjunctivae normal.   Cardiovascular:      Rate and Rhythm: Normal rate and regular rhythm.      Heart sounds: Normal heart sounds.   Pulmonary:      Effort: Pulmonary effort is normal.      Breath sounds: Normal breath sounds.   Chest:   Breasts:     Right: Normal.      Comments: Left breast with incision outer quadrant, left axillary incision, no palpable mass in either breast and no adenopathy  Abdominal:      General: Abdomen is flat. Bowel sounds are normal. There is no distension.      Palpations: Abdomen is soft.      Tenderness: There is no abdominal tenderness.   Musculoskeletal:         General: Normal range of motion.      Right lower leg: No edema.      Left lower leg: No edema.   Skin:     General: Skin is warm and dry.   Neurological:      General: No focal deficit present.      Mental Status: She is alert and oriented to person, place, and time.   Psychiatric:         Attention and Perception: Attention normal.         Mood and Affect: Mood normal.         Speech: Speech normal.         Behavior: Behavior is cooperative.         Judgment: Judgment normal.     No visits  with results within 1 Day(s) from this visit.   Latest known visit with results is:   Lab Visit on 07/14/2023   Component Date Value Ref Range Status    Sodium Level 07/14/2023 139  136 - 145 mmol/L Final    Potassium Level 07/14/2023 4.0  3.5 - 5.1 mmol/L Final    Chloride 07/14/2023 104  98 - 107 mmol/L Final    Carbon Dioxide 07/14/2023 28  23 - 31 mmol/L Final    Glucose Level 07/14/2023 114  82 - 115 mg/dL Final    Blood Urea Nitrogen 07/14/2023 16.7  9.8 - 20.1 mg/dL Final    Creatinine 07/14/2023 0.64  0.55 - 1.02 mg/dL Final    Calcium Level Total 07/14/2023 9.6  8.4 - 10.2 mg/dL Final    Protein Total 07/14/2023 6.5  5.8 - 7.6 gm/dL Final    Albumin Level 07/14/2023 4.1  3.4 - 4.8 g/dL Final    Globulin 07/14/2023 2.4  2.4 - 3.5 gm/dL Final    Albumin/Globulin Ratio 07/14/2023 1.7  1.1 - 2.0 ratio Final    Bilirubin Total 07/14/2023 0.6  <=1.5 mg/dL Final    Alkaline Phosphatase 07/14/2023 45  40 - 150 unit/L Final    Alanine Aminotransferase 07/14/2023 12  0 - 55 unit/L Final    Aspartate Aminotransferase 07/14/2023 14  5 - 34 unit/L Final    eGFR 07/14/2023 >60  mls/min/1.73/m2 Final    WBC 07/14/2023 4.05 (L)  4.50 - 11.50 x10(3)/mcL Final    RBC 07/14/2023 4.42  4.20 - 5.40 x10(6)/mcL Final    Hgb 07/14/2023 13.1  12.0 - 16.0 g/dL Final    Hct 07/14/2023 42.0  37.0 - 47.0 % Final    MCV 07/14/2023 95.0 (H)  80.0 - 94.0 fL Final    MCH 07/14/2023 29.6  27.0 - 31.0 pg Final    MCHC 07/14/2023 31.2 (L)  33.0 - 36.0 g/dL Final    RDW 07/14/2023 13.1  11.5 - 17.0 % Final    Platelet 07/14/2023 215  130 - 400 x10(3)/mcL Final    MPV 07/14/2023 8.6  7.4 - 10.4 fL Final    Neut % 07/14/2023 58.6  % Final    Lymph % 07/14/2023 27.7  % Final    Mono % 07/14/2023 11.1  % Final    Eos % 07/14/2023 2.2  % Final    Basophil % 07/14/2023 0.2  % Final    Lymph # 07/14/2023 1.12  0.6 - 4.6 x10(3)/mcL Final    Neut # 07/14/2023 2.37  2.1 - 9.2 x10(3)/mcL Final    Mono # 07/14/2023 0.45  0.1 - 1.3 x10(3)/mcL Final    Eos  # 07/14/2023 0.09  0 - 0.9 x10(3)/mcL Final    Baso # 07/14/2023 0.01  <=0.2 x10(3)/mcL Final    IG# 07/14/2023 0.01  0 - 0.04 x10(3)/mcL Final    IG% 07/14/2023 0.2  % Final       Assessment:       1. Malignant neoplasm of upper-outer quadrant of left breast in female, estrogen receptor positive    2. Osteopenia of necks of both femurs            Plan:    Patient with Stage IA breast cancer ([id number]). S/p lumpectomy on 3/21/22. Tumor measured 9mm, Grade 1, invasive mucinous carcinoma, margins clear with 4 negative SLNs, strongly ER and MO positive and Her2 negative, with low Ki67.  Due to favorable histology, per NCCN, no chemotherapy recommended.  Treatment recommended with AI X 5 years.  Patient completed adjuvant radiation on 6/1/22.    Currently patient is without any clinical symptoms or laboratory evidence of recurrence.   Started Femara on 6/15/22. Initially, she had numerous complaints -  nausea, dizziness, diarrhea, hot flashes and fogginess. We were not sure if all her complaints were from the Femara so it was held. Symptoms continued after holding so she was instructed to restart the Femara but she did not.   Discussed the importance of hormonal blockade. She would like to try Arimidex instead. Prescription sent to her pharmacy.   Recent labs all good. Mild intermittent leukopenia which has been off/on.   Baseline DEXA in February revealed osteopenia. We discussed different options and she agreed to start Prolia. Started November 2022. Next dose due November 2023.   Continue Caltrate + D twice daily.   Bilateral MMG on 2/13/23 at [facility] benign. Recommend to repeat in 1 year.   Continue surveillance visits every 4 months.     All questions answered at this time.      GUSTAVO Abel

## 2023-07-14 ENCOUNTER — LAB VISIT (OUTPATIENT)
Dept: LAB | Facility: HOSPITAL | Age: 78
End: 2023-07-14
Attending: FAMILY MEDICINE
Payer: MEDICARE

## 2023-07-14 DIAGNOSIS — M85.851 OSTEOPENIA OF NECKS OF BOTH FEMURS: ICD-10-CM

## 2023-07-14 DIAGNOSIS — Z17.0 MALIGNANT NEOPLASM OF UPPER-OUTER QUADRANT OF LEFT BREAST IN FEMALE, ESTROGEN RECEPTOR POSITIVE: ICD-10-CM

## 2023-07-14 DIAGNOSIS — M85.852 OSTEOPENIA OF NECKS OF BOTH FEMURS: ICD-10-CM

## 2023-07-14 DIAGNOSIS — C50.412 MALIGNANT NEOPLASM OF UPPER-OUTER QUADRANT OF LEFT BREAST IN FEMALE, ESTROGEN RECEPTOR POSITIVE: ICD-10-CM

## 2023-07-14 LAB
ALBUMIN SERPL-MCNC: 4.1 G/DL (ref 3.4–4.8)
ALBUMIN/GLOB SERPL: 1.7 RATIO (ref 1.1–2)
ALP SERPL-CCNC: 45 UNIT/L (ref 40–150)
ALT SERPL-CCNC: 12 UNIT/L (ref 0–55)
AST SERPL-CCNC: 14 UNIT/L (ref 5–34)
BASOPHILS # BLD AUTO: 0.01 X10(3)/MCL
BASOPHILS NFR BLD AUTO: 0.2 %
BILIRUBIN DIRECT+TOT PNL SERPL-MCNC: 0.6 MG/DL
BUN SERPL-MCNC: 16.7 MG/DL (ref 9.8–20.1)
CALCIUM SERPL-MCNC: 9.6 MG/DL (ref 8.4–10.2)
CHLORIDE SERPL-SCNC: 104 MMOL/L (ref 98–107)
CO2 SERPL-SCNC: 28 MMOL/L (ref 23–31)
CREAT SERPL-MCNC: 0.64 MG/DL (ref 0.55–1.02)
EOSINOPHIL # BLD AUTO: 0.09 X10(3)/MCL (ref 0–0.9)
EOSINOPHIL NFR BLD AUTO: 2.2 %
ERYTHROCYTE [DISTWIDTH] IN BLOOD BY AUTOMATED COUNT: 13.1 % (ref 11.5–17)
GFR SERPLBLD CREATININE-BSD FMLA CKD-EPI: >60 MLS/MIN/1.73/M2
GLOBULIN SER-MCNC: 2.4 GM/DL (ref 2.4–3.5)
GLUCOSE SERPL-MCNC: 114 MG/DL (ref 82–115)
HCT VFR BLD AUTO: 42 % (ref 37–47)
HGB BLD-MCNC: 13.1 G/DL (ref 12–16)
IMM GRANULOCYTES # BLD AUTO: 0.01 X10(3)/MCL (ref 0–0.04)
IMM GRANULOCYTES NFR BLD AUTO: 0.2 %
LYMPHOCYTES # BLD AUTO: 1.12 X10(3)/MCL (ref 0.6–4.6)
LYMPHOCYTES NFR BLD AUTO: 27.7 %
MCH RBC QN AUTO: 29.6 PG (ref 27–31)
MCHC RBC AUTO-ENTMCNC: 31.2 G/DL (ref 33–36)
MCV RBC AUTO: 95 FL (ref 80–94)
MONOCYTES # BLD AUTO: 0.45 X10(3)/MCL (ref 0.1–1.3)
MONOCYTES NFR BLD AUTO: 11.1 %
NEUTROPHILS # BLD AUTO: 2.37 X10(3)/MCL (ref 2.1–9.2)
NEUTROPHILS NFR BLD AUTO: 58.6 %
PLATELET # BLD AUTO: 215 X10(3)/MCL (ref 130–400)
PMV BLD AUTO: 8.6 FL (ref 7.4–10.4)
POTASSIUM SERPL-SCNC: 4 MMOL/L (ref 3.5–5.1)
PROT SERPL-MCNC: 6.5 GM/DL (ref 5.8–7.6)
RBC # BLD AUTO: 4.42 X10(6)/MCL (ref 4.2–5.4)
SODIUM SERPL-SCNC: 139 MMOL/L (ref 136–145)
WBC # SPEC AUTO: 4.05 X10(3)/MCL (ref 4.5–11.5)

## 2023-07-14 PROCEDURE — 85025 COMPLETE CBC W/AUTO DIFF WBC: CPT

## 2023-07-14 PROCEDURE — 36415 COLL VENOUS BLD VENIPUNCTURE: CPT

## 2023-07-14 PROCEDURE — 80053 COMPREHEN METABOLIC PANEL: CPT

## 2023-07-19 ENCOUNTER — OFFICE VISIT (OUTPATIENT)
Dept: HEMATOLOGY/ONCOLOGY | Facility: CLINIC | Age: 78
End: 2023-07-19
Payer: MEDICARE

## 2023-07-19 VITALS
RESPIRATION RATE: 14 BRPM | WEIGHT: 139 LBS | TEMPERATURE: 98 F | DIASTOLIC BLOOD PRESSURE: 70 MMHG | HEART RATE: 67 BPM | HEIGHT: 63 IN | SYSTOLIC BLOOD PRESSURE: 131 MMHG | OXYGEN SATURATION: 99 % | BODY MASS INDEX: 24.63 KG/M2

## 2023-07-19 DIAGNOSIS — Z12.31 ENCOUNTER FOR SCREENING MAMMOGRAM FOR BREAST CANCER: ICD-10-CM

## 2023-07-19 DIAGNOSIS — M85.851 OSTEOPENIA OF NECKS OF BOTH FEMURS: ICD-10-CM

## 2023-07-19 DIAGNOSIS — M85.852 OSTEOPENIA OF NECKS OF BOTH FEMURS: ICD-10-CM

## 2023-07-19 DIAGNOSIS — Z17.0 MALIGNANT NEOPLASM OF UPPER-OUTER QUADRANT OF LEFT BREAST IN FEMALE, ESTROGEN RECEPTOR POSITIVE: Primary | ICD-10-CM

## 2023-07-19 DIAGNOSIS — C50.412 MALIGNANT NEOPLASM OF UPPER-OUTER QUADRANT OF LEFT BREAST IN FEMALE, ESTROGEN RECEPTOR POSITIVE: Primary | ICD-10-CM

## 2023-07-19 PROCEDURE — 1160F PR REVIEW ALL MEDS BY PRESCRIBER/CLIN PHARMACIST DOCUMENTED: ICD-10-PCS | Mod: CPTII,S$GLB,, | Performed by: NURSE PRACTITIONER

## 2023-07-19 PROCEDURE — 3288F PR FALLS RISK ASSESSMENT DOCUMENTED: ICD-10-PCS | Mod: CPTII,S$GLB,, | Performed by: NURSE PRACTITIONER

## 2023-07-19 PROCEDURE — 3078F PR MOST RECENT DIASTOLIC BLOOD PRESSURE < 80 MM HG: ICD-10-PCS | Mod: CPTII,S$GLB,, | Performed by: NURSE PRACTITIONER

## 2023-07-19 PROCEDURE — 1160F RVW MEDS BY RX/DR IN RCRD: CPT | Mod: CPTII,S$GLB,, | Performed by: NURSE PRACTITIONER

## 2023-07-19 PROCEDURE — 99999 PR PBB SHADOW E&M-EST. PATIENT-LVL V: ICD-10-PCS | Mod: PBBFAC,,, | Performed by: NURSE PRACTITIONER

## 2023-07-19 PROCEDURE — 3078F DIAST BP <80 MM HG: CPT | Mod: CPTII,S$GLB,, | Performed by: NURSE PRACTITIONER

## 2023-07-19 PROCEDURE — 3075F SYST BP GE 130 - 139MM HG: CPT | Mod: CPTII,S$GLB,, | Performed by: NURSE PRACTITIONER

## 2023-07-19 PROCEDURE — 99214 OFFICE O/P EST MOD 30 MIN: CPT | Mod: S$GLB,,, | Performed by: NURSE PRACTITIONER

## 2023-07-19 PROCEDURE — 3075F PR MOST RECENT SYSTOLIC BLOOD PRESS GE 130-139MM HG: ICD-10-PCS | Mod: CPTII,S$GLB,, | Performed by: NURSE PRACTITIONER

## 2023-07-19 PROCEDURE — 1101F PR PT FALLS ASSESS DOC 0-1 FALLS W/OUT INJ PAST YR: ICD-10-PCS | Mod: CPTII,S$GLB,, | Performed by: NURSE PRACTITIONER

## 2023-07-19 PROCEDURE — 1159F PR MEDICATION LIST DOCUMENTED IN MEDICAL RECORD: ICD-10-PCS | Mod: CPTII,S$GLB,, | Performed by: NURSE PRACTITIONER

## 2023-07-19 PROCEDURE — 99214 PR OFFICE/OUTPT VISIT, EST, LEVL IV, 30-39 MIN: ICD-10-PCS | Mod: S$GLB,,, | Performed by: NURSE PRACTITIONER

## 2023-07-19 PROCEDURE — 1126F AMNT PAIN NOTED NONE PRSNT: CPT | Mod: CPTII,S$GLB,, | Performed by: NURSE PRACTITIONER

## 2023-07-19 PROCEDURE — 1101F PT FALLS ASSESS-DOCD LE1/YR: CPT | Mod: CPTII,S$GLB,, | Performed by: NURSE PRACTITIONER

## 2023-07-19 PROCEDURE — 99999 PR PBB SHADOW E&M-EST. PATIENT-LVL V: CPT | Mod: PBBFAC,,, | Performed by: NURSE PRACTITIONER

## 2023-07-19 PROCEDURE — 1126F PR PAIN SEVERITY QUANTIFIED, NO PAIN PRESENT: ICD-10-PCS | Mod: CPTII,S$GLB,, | Performed by: NURSE PRACTITIONER

## 2023-07-19 PROCEDURE — 1159F MED LIST DOCD IN RCRD: CPT | Mod: CPTII,S$GLB,, | Performed by: NURSE PRACTITIONER

## 2023-07-19 PROCEDURE — 3288F FALL RISK ASSESSMENT DOCD: CPT | Mod: CPTII,S$GLB,, | Performed by: NURSE PRACTITIONER

## 2023-07-19 RX ORDER — ANASTROZOLE 1 MG/1
1 TABLET ORAL DAILY
Qty: 30 TABLET | Refills: 0 | Status: SHIPPED | OUTPATIENT
Start: 2023-07-19 | End: 2023-07-19 | Stop reason: SDUPTHER

## 2023-07-19 RX ORDER — ANASTROZOLE 1 MG/1
1 TABLET ORAL DAILY
Qty: 90 TABLET | Refills: 3 | Status: SHIPPED | OUTPATIENT
Start: 2023-07-19 | End: 2024-04-03 | Stop reason: SDUPTHER

## 2023-07-20 ENCOUNTER — TELEPHONE (OUTPATIENT)
Dept: HEMATOLOGY/ONCOLOGY | Facility: CLINIC | Age: 78
End: 2023-07-20
Payer: MEDICARE

## 2023-07-20 NOTE — TELEPHONE ENCOUNTER
Per Zena, I called in the following script to [facility] in [place] @ [phone] and spoke to Addie.  Arimidex 1mg daily. Disp # 30 tabs with no refills.   I called the pt to notify her and she voiced appreciation and states that she will start taking tonight.

## 2023-07-20 NOTE — TELEPHONE ENCOUNTER
Pt called bc she went to [facility] in [place] and they did not have her script for Arimidex. States that you were going to send main script to [facility] and a 30 day supply to [facility]. Please advise.

## 2023-08-15 ENCOUNTER — DOCUMENTATION ONLY (OUTPATIENT)
Dept: SURGICAL ONCOLOGY | Facility: CLINIC | Age: 78
End: 2023-08-15

## 2023-08-15 LAB — BCS RECOMMENDATION EXT: NORMAL

## 2023-08-16 ENCOUNTER — OFFICE VISIT (OUTPATIENT)
Dept: SURGICAL ONCOLOGY | Facility: CLINIC | Age: 78
End: 2023-08-16
Payer: MEDICARE

## 2023-08-16 VITALS
BODY MASS INDEX: 24.59 KG/M2 | WEIGHT: 138.81 LBS | HEART RATE: 69 BPM | SYSTOLIC BLOOD PRESSURE: 118 MMHG | DIASTOLIC BLOOD PRESSURE: 74 MMHG | HEIGHT: 63 IN

## 2023-08-16 DIAGNOSIS — Z85.3 HX OF BREAST CANCER: Primary | ICD-10-CM

## 2023-08-16 DIAGNOSIS — Z85.3 HISTORY OF BREAST CANCER: Primary | ICD-10-CM

## 2023-08-16 PROCEDURE — 1159F MED LIST DOCD IN RCRD: CPT | Mod: CPTII,S$GLB,, | Performed by: SURGERY

## 2023-08-16 PROCEDURE — 99999 PR PBB SHADOW E&M-EST. PATIENT-LVL III: CPT | Mod: PBBFAC,,, | Performed by: SURGERY

## 2023-08-16 PROCEDURE — 3288F PR FALLS RISK ASSESSMENT DOCUMENTED: ICD-10-PCS | Mod: CPTII,S$GLB,, | Performed by: SURGERY

## 2023-08-16 PROCEDURE — 3078F DIAST BP <80 MM HG: CPT | Mod: CPTII,S$GLB,, | Performed by: SURGERY

## 2023-08-16 PROCEDURE — 99999 PR PBB SHADOW E&M-EST. PATIENT-LVL III: ICD-10-PCS | Mod: PBBFAC,,, | Performed by: SURGERY

## 2023-08-16 PROCEDURE — 1126F PR PAIN SEVERITY QUANTIFIED, NO PAIN PRESENT: ICD-10-PCS | Mod: CPTII,S$GLB,, | Performed by: SURGERY

## 2023-08-16 PROCEDURE — 3078F PR MOST RECENT DIASTOLIC BLOOD PRESSURE < 80 MM HG: ICD-10-PCS | Mod: CPTII,S$GLB,, | Performed by: SURGERY

## 2023-08-16 PROCEDURE — 3288F FALL RISK ASSESSMENT DOCD: CPT | Mod: CPTII,S$GLB,, | Performed by: SURGERY

## 2023-08-16 PROCEDURE — 1101F PT FALLS ASSESS-DOCD LE1/YR: CPT | Mod: CPTII,S$GLB,, | Performed by: SURGERY

## 2023-08-16 PROCEDURE — 99214 OFFICE O/P EST MOD 30 MIN: CPT | Mod: S$GLB,,, | Performed by: SURGERY

## 2023-08-16 PROCEDURE — 99214 PR OFFICE/OUTPT VISIT, EST, LEVL IV, 30-39 MIN: ICD-10-PCS | Mod: S$GLB,,, | Performed by: SURGERY

## 2023-08-16 PROCEDURE — 1126F AMNT PAIN NOTED NONE PRSNT: CPT | Mod: CPTII,S$GLB,, | Performed by: SURGERY

## 2023-08-16 PROCEDURE — 3074F SYST BP LT 130 MM HG: CPT | Mod: CPTII,S$GLB,, | Performed by: SURGERY

## 2023-08-16 PROCEDURE — 3074F PR MOST RECENT SYSTOLIC BLOOD PRESSURE < 130 MM HG: ICD-10-PCS | Mod: CPTII,S$GLB,, | Performed by: SURGERY

## 2023-08-16 PROCEDURE — 1101F PR PT FALLS ASSESS DOC 0-1 FALLS W/OUT INJ PAST YR: ICD-10-PCS | Mod: CPTII,S$GLB,, | Performed by: SURGERY

## 2023-08-16 PROCEDURE — 1159F PR MEDICATION LIST DOCUMENTED IN MEDICAL RECORD: ICD-10-PCS | Mod: CPTII,S$GLB,, | Performed by: SURGERY

## 2023-08-16 RX ORDER — DENOSUMAB 60 MG/ML
60 INJECTION SUBCUTANEOUS
COMMUNITY

## 2023-08-16 NOTE — PROGRESS NOTES
Chief complaint:  Breast cancer follow-up     HPI: 77 year-old-female referred by Dr. Wendy Siddiqui who underwent routine screening mammogram showing calcifications in both breast and an asymmetry in the outer left breast, 2 o'clock position, 4 cm from the nipple.. Diagnostic imaging showed a left breast lobulated mass measuring 1cm. Biopsy was performed, pathology showing invasive ductal carcinoma. Breast receptor studies are pending. The patient reports remote history of a benign breast biopsy in the past. She reports no family history of breast or ovarian cancer.    The patient underwent left breast lumpectomy with sentinel lymph node in March 2022 with final pathology revealing 9 mm invasive mucinous carcinoma well-differentiated grade 1 with negative margins, 4- sentinel nodes, ER/NY positive, HER2 negative, Ki-67 index 5.3%.  She completed adjuvant radiation therapy.  She discontinued Femara due to side effects.  I reviewed medical oncology progress notes.    She denies breast masses, skin changes, nipple inversion or discharge on self-breast exam.    Recent surveillance mammography was reviewed, I personally interpreted the images and reviewed the report.  I discussed with the patient in detail and questions were addressed.  There are no suspicious findings.    Greater than 30 minutes was required for complete chart review, imaging review patient consultation and documentation            Past Medical and Surgical History  Allergies :   Patient has no known allergies.    [facility]  Medical :   She has a past medical history of Anxiety.    Surgical :   She has a past surgical history that includes Hysterectomy; Shoulder surgery; Cholecystectomy; Knee surgery; and LUMPECTOMY,BREAST,WITH RADIOACTIVE SEED LOCALIZATION AND SENTINEL LYMPH NODE BIOPSY (Left).     Family History  Her family history includes Cancer in her father; Diabetes in her brother.    Social History  She reports that she has never smoked. She  has never used smokeless tobacco. She reports that she does not currently use alcohol. She reports that she does not use drugs.     Review of Systems   Constitutional:  Negative for appetite change, chills, diaphoresis and fever.   HENT:  Negative for congestion, drooling, ear discharge, ear pain and hearing loss.    Eyes:  Negative for discharge.   Respiratory:  Negative for apnea, cough, choking, chest tightness, shortness of breath and stridor.    Cardiovascular:  Negative for chest pain, palpitations and leg swelling.   Endocrine: Negative for cold intolerance and heat intolerance.   Genitourinary:  Negative for difficulty urinating, dyspareunia, dysuria and hematuria.   Musculoskeletal:  Negative for arthralgias, gait problem and joint swelling.   Skin:  Negative for color change and rash.   Neurological:  Negative for dizziness, tremors, seizures, syncope, facial asymmetry, speech difficulty, light-headedness, numbness and headaches.   Psychiatric/Behavioral:  Negative for agitation and confusion.         Objective   Physical Exam  Vitals and nursing note reviewed.   Constitutional:       General: She is not in acute distress.     Appearance: Normal appearance. She is normal weight. She is not ill-appearing, toxic-appearing or diaphoretic.   HENT:      Head: Normocephalic and atraumatic.      Right Ear: External ear normal.      Left Ear: External ear normal.      Nose: Nose normal.      Mouth/Throat:      Mouth: Mucous membranes are moist.      Pharynx: Oropharynx is clear.   Eyes:      General: No scleral icterus.     Extraocular Movements: Extraocular movements intact.      Conjunctiva/sclera: Conjunctivae normal.      Pupils: Pupils are equal, round, and reactive to light.   Cardiovascular:      Rate and Rhythm: Normal rate and regular rhythm.      Pulses: Normal pulses.      Heart sounds: Normal heart sounds. No murmur heard.     No friction rub. No gallop.   Pulmonary:      Effort: Pulmonary effort is  normal. No respiratory distress.      Breath sounds: Normal breath sounds. No stridor. No wheezing or rhonchi.   Chest:      Chest wall: No tenderness.   Breasts:     Right: No swelling, bleeding, inverted nipple, mass, nipple discharge, skin change or tenderness.      Left: No swelling, bleeding, inverted nipple, mass, nipple discharge, skin change or tenderness.   Abdominal:      General: Abdomen is flat. There is no distension.      Palpations: Abdomen is soft. There is no mass.      Tenderness: There is no abdominal tenderness. There is no right CVA tenderness, left CVA tenderness, guarding or rebound.      Hernia: No hernia is present.   Musculoskeletal:         General: No swelling, tenderness, deformity or signs of injury. Normal range of motion.      Cervical back: Normal range of motion and neck supple. No rigidity or tenderness.      Right lower leg: No edema.      Left lower leg: No edema.   Lymphadenopathy:      Cervical: No cervical adenopathy.      Upper Body:      Right upper body: No supraclavicular or axillary adenopathy.      Left upper body: No supraclavicular or axillary adenopathy.   Skin:     General: Skin is warm.      Capillary Refill: Capillary refill takes less than 2 seconds.      Coloration: Skin is not jaundiced or pale.      Findings: No bruising, erythema, lesion or rash.   Neurological:      General: No focal deficit present.      Mental Status: She is alert and oriented to person, place, and time. Mental status is at baseline.      Cranial Nerves: No cranial nerve deficit.      Sensory: No sensory deficit.      Motor: No weakness.      Coordination: Coordination normal.      Gait: Gait normal.   Psychiatric:         Mood and Affect: Mood normal.         Behavior: Behavior normal.         Thought Content: Thought content normal.         Judgment: Judgment normal.       VITAL SIGNS: 24 HR MIN & MAX LAST    @FLOWSTAT(6:24::1)@           @FLOWSTAT(5:24::1)@  118/74      "@FLOWSTAT(8:24::1)@  69     @FLOWSTAT(9:24::1)@       @FLOWSTAT(10:24::1)@         HT: 5' 3" (160 cm)  WT: 63 kg (138 lb 12.8 oz)  BMI: 24.6       Assessment & Plan     Stage I A left breast mucinous carcinoma status post breast conserving therapy     No evidence of disease  Return to clinic in 6 months with bilateral diagnostic mammography    Brandon Samano MD  Surgical Oncology  Complex General, Gastrointestinal and Hepatobiliary Surgery        "

## 2023-11-24 ENCOUNTER — LAB VISIT (OUTPATIENT)
Dept: LAB | Facility: HOSPITAL | Age: 78
End: 2023-11-24
Attending: INTERNAL MEDICINE
Payer: MEDICARE

## 2023-11-24 DIAGNOSIS — Z17.0 MALIGNANT NEOPLASM OF UPPER-OUTER QUADRANT OF LEFT BREAST IN FEMALE, ESTROGEN RECEPTOR POSITIVE: ICD-10-CM

## 2023-11-24 DIAGNOSIS — C50.412 MALIGNANT NEOPLASM OF UPPER-OUTER QUADRANT OF LEFT BREAST IN FEMALE, ESTROGEN RECEPTOR POSITIVE: ICD-10-CM

## 2023-11-24 DIAGNOSIS — M85.851 OSTEOPENIA OF NECKS OF BOTH FEMURS: ICD-10-CM

## 2023-11-24 DIAGNOSIS — M85.852 OSTEOPENIA OF NECKS OF BOTH FEMURS: ICD-10-CM

## 2023-11-24 DIAGNOSIS — Z12.31 ENCOUNTER FOR SCREENING MAMMOGRAM FOR BREAST CANCER: ICD-10-CM

## 2023-11-24 LAB
ALBUMIN SERPL-MCNC: 4.2 G/DL (ref 3.4–4.8)
ALBUMIN/GLOB SERPL: 1.6 RATIO (ref 1.1–2)
ALP SERPL-CCNC: 40 UNIT/L (ref 40–150)
ALT SERPL-CCNC: 14 UNIT/L (ref 0–55)
AST SERPL-CCNC: 17 UNIT/L (ref 5–34)
BASOPHILS # BLD AUTO: 0.02 X10(3)/MCL
BASOPHILS NFR BLD AUTO: 0.4 %
BILIRUB SERPL-MCNC: 0.7 MG/DL
BUN SERPL-MCNC: 16.4 MG/DL (ref 9.8–20.1)
CALCIUM SERPL-MCNC: 9.3 MG/DL (ref 8.4–10.2)
CHLORIDE SERPL-SCNC: 104 MMOL/L (ref 98–107)
CO2 SERPL-SCNC: 28 MMOL/L (ref 23–31)
CREAT SERPL-MCNC: 0.62 MG/DL (ref 0.55–1.02)
EOSINOPHIL # BLD AUTO: 0.11 X10(3)/MCL (ref 0–0.9)
EOSINOPHIL NFR BLD AUTO: 2.5 %
ERYTHROCYTE [DISTWIDTH] IN BLOOD BY AUTOMATED COUNT: 12.9 % (ref 11.5–17)
GFR SERPLBLD CREATININE-BSD FMLA CKD-EPI: >60 MLS/MIN/1.73/M2
GLOBULIN SER-MCNC: 2.6 GM/DL (ref 2.4–3.5)
GLUCOSE SERPL-MCNC: 110 MG/DL (ref 82–115)
HCT VFR BLD AUTO: 43.7 % (ref 37–47)
HGB BLD-MCNC: 13.4 G/DL (ref 12–16)
IMM GRANULOCYTES # BLD AUTO: 0.01 X10(3)/MCL (ref 0–0.04)
IMM GRANULOCYTES NFR BLD AUTO: 0.2 %
LYMPHOCYTES # BLD AUTO: 1.22 X10(3)/MCL (ref 0.6–4.6)
LYMPHOCYTES NFR BLD AUTO: 27.4 %
MCH RBC QN AUTO: 29.6 PG (ref 27–31)
MCHC RBC AUTO-ENTMCNC: 30.7 G/DL (ref 33–36)
MCV RBC AUTO: 96.5 FL (ref 80–94)
MONOCYTES # BLD AUTO: 0.42 X10(3)/MCL (ref 0.1–1.3)
MONOCYTES NFR BLD AUTO: 9.4 %
NEUTROPHILS # BLD AUTO: 2.67 X10(3)/MCL (ref 2.1–9.2)
NEUTROPHILS NFR BLD AUTO: 60.1 %
PLATELET # BLD AUTO: 229 X10(3)/MCL (ref 130–400)
PMV BLD AUTO: 8.8 FL (ref 7.4–10.4)
POTASSIUM SERPL-SCNC: 3.9 MMOL/L (ref 3.5–5.1)
PROT SERPL-MCNC: 6.8 GM/DL (ref 5.8–7.6)
RBC # BLD AUTO: 4.53 X10(6)/MCL (ref 4.2–5.4)
SODIUM SERPL-SCNC: 139 MMOL/L (ref 136–145)
WBC # SPEC AUTO: 4.45 X10(3)/MCL (ref 4.5–11.5)

## 2023-11-24 PROCEDURE — 85025 COMPLETE CBC W/AUTO DIFF WBC: CPT

## 2023-11-24 PROCEDURE — 36415 COLL VENOUS BLD VENIPUNCTURE: CPT

## 2023-11-24 PROCEDURE — 80053 COMPREHEN METABOLIC PANEL: CPT

## 2023-11-26 NOTE — PROGRESS NOTES
Subjective:       Patient ID: Nadja Monae is a 77 y.o. female.    Referring physician: Dr. Brandon Samano  Reason for Referral: Breast Cancer    Left Breast Cancer Stage IA ([id number])--Diagnosed 22  Biopsy/pathology:  Left breast biopsy done 22--2:00 4CMFN core biopsies positive for invasive ductal carcinoma, mucinous type, intermediate grade, measuring at least 0.7cm, DCIS, cribriform pattern, intermediate grade, w/o necrosis, %, IN 87%, Her2 1+ by IHC, negative by FISH, Ki67 5.3%.  Surgery/pathology: Left breast lumpectomy with SLN biopsy done 3/21/22--invasive mucinous carcinoma, well differentiated, Grade 1, measuring 0.9cm, clear margins, 4 negative SLNs.   Imagin. Screening Oscar MMG bilateral done at [facility] 22--an asymmetry anterior UOQ of left breast, measures 1.3cm, needs additional imaging, BIRADS 0.  2. Diagnostic MMG/US done at [facility] 2/15/22--lobulated mass in left breast at 2:00 4CMFN, measures 0.9X0.5X1cm, suspicious by US, focal asymmetry in UOQ of left breast does persist on MMG, BIRADS 4, biopsy recommeded.     [facility] DEXA 22--AP spine T-score 0.4, Total hip left -1.0, right -1.0, Femur left -1.9, right -1.7 c/w osteopoenia bilateral femurs.     Treatment history:  Left breast lumpectomy and SLN biopsy 3/21/22.  Adjuvant radiation completed 22.    Current treatment plan:  Femara X 5 years. Started on 6/15/22. Stopped ? 2023.   Started on Arimidex on 23.   Prolia every 6 months. Started on 22. Next dose in 2023.    Chief Complaint: Other (Pt repots that her Prolia inj is this Wednesday and she is having knee sx next Monday. She wants to know if its okay to get Prolia inj on Wednesday. )    HPI   Patient presents for follow-up of breast cancer. She was started on Arimidex last visit. She is tolerating much better. Also continues on Prolia and is scheduled to get a dose this week. She reports that she is having a knee surgery next week to replace  hardware that was recalled.      Past Medical History:   Diagnosis Date    Anxiety       Review of patient's allergies indicates:  No Known Allergies   Current Outpatient Medications on File Prior to Visit   Medication Sig Dispense Refill    allopurinoL (ZYLOPRIM) 300 MG tablet Take 300 mg by mouth once daily.      anastrozole (ARIMIDEX) 1 mg Tab Take 1 tablet (1 mg total) by mouth once daily. 90 tablet 3    citalopram (CELEXA) 20 MG tablet       denosumab (PROLIA) 60 mg/mL Syrg Inject 60 mg into the skin.      dulaglutide (TRULICITY) 1.5 mg/0.5 mL pen injector Inject 1.5 mg into the skin every 7 days.      glimepiride (AMARYL) 4 MG tablet Take 4 mg by mouth once daily.      metFORMIN (GLUCOPHAGE) 1000 MG tablet Take 1,000 mg by mouth 2 (two) times daily.      ONETOUCH DELICA PLUS LANCET 33 gauge Misc Apply topically.      [employer] MedicalTOUCH ULTRA TEST Strp 2 (two) times a day. Test      ONETOUCH ULTRA2 METER Misc SMARTSIG:Via Meter Daily      pioglitazone (ACTOS) 30 MG tablet Take 15 mg by mouth once daily.      rosuvastatin (CRESTOR) 20 MG tablet Take 5 mg by mouth Daily.      dapagliflozin (FARXIGA) 10 mg tablet   0 Refill(s)      diclofenac (VOLTAREN) 75 MG EC tablet Take 75 mg by mouth 2 (two) times daily.      isosorbide mononitrate (IMDUR) 30 MG 24 hr tablet Take 15 mg by mouth once daily.      ofloxacin (OCUFLOX) 0.3 % ophthalmic solution SMARTSIG:In Ear(s)      [DISCONTINUED] EScitalopram oxalate (LEXAPRO) 20 MG tablet Take 20 mg by mouth once daily.       No current facility-administered medications on file prior to visit.      Review of Systems   Constitutional:  Negative for appetite change and fever.   HENT:  Negative for mouth sores.    Eyes: Negative.    Respiratory:  Negative for cough and shortness of breath.    Cardiovascular:  Negative for chest pain and leg swelling.   Gastrointestinal:  Negative for abdominal distention, abdominal pain, constipation, diarrhea, vomiting and reflux.   Genitourinary:   Negative for difficulty urinating, dysuria and hematuria.   Musculoskeletal:  Negative for arthralgias and back pain.   Integumentary:  Negative for rash.   Neurological:  Negative for weakness and headaches.   Hematological:  Negative for adenopathy.   Psychiatric/Behavioral:  Negative for sleep disturbance. The patient is not nervous/anxious.          Vitals:    11/27/23 0903   BP: 124/69   Pulse: 68   Resp: 16   Temp: 97.4 °F (36.3 °C)        Physical Exam  Constitutional:       Appearance: Normal appearance. She is normal weight.   HENT:      Head: Normocephalic.      Nose: Nose normal.      Mouth/Throat:      Mouth: Mucous membranes are moist.   Eyes:      Extraocular Movements: Extraocular movements intact.      Conjunctiva/sclera: Conjunctivae normal.   Cardiovascular:      Rate and Rhythm: Normal rate and regular rhythm.      Heart sounds: Normal heart sounds.   Pulmonary:      Effort: Pulmonary effort is normal.      Breath sounds: Normal breath sounds.   Chest:   Breasts:     Right: Normal.      Comments: Left breast with incision outer quadrant, left axillary incision, no palpable mass in either breast and no adenopathy  Abdominal:      General: Abdomen is flat. Bowel sounds are normal. There is no distension.      Palpations: Abdomen is soft.      Tenderness: There is no abdominal tenderness.   Musculoskeletal:         General: Normal range of motion.      Right lower leg: No edema.      Left lower leg: No edema.   Skin:     General: Skin is warm and dry.   Neurological:      General: No focal deficit present.      Mental Status: She is alert and oriented to person, place, and time.   Psychiatric:         Attention and Perception: Attention normal.         Mood and Affect: Mood normal.         Speech: Speech normal.         Behavior: Behavior is cooperative.         Judgment: Judgment normal.       No visits with results within 1 Day(s) from this visit.   Latest known visit with results is:   Lab Visit  on 11/24/2023   Component Date Value Ref Range Status    Sodium Level 11/24/2023 139  136 - 145 mmol/L Final    Potassium Level 11/24/2023 3.9  3.5 - 5.1 mmol/L Final    Chloride 11/24/2023 104  98 - 107 mmol/L Final    Carbon Dioxide 11/24/2023 28  23 - 31 mmol/L Final    Glucose Level 11/24/2023 110  82 - 115 mg/dL Final    Blood Urea Nitrogen 11/24/2023 16.4  9.8 - 20.1 mg/dL Final    Creatinine 11/24/2023 0.62  0.55 - 1.02 mg/dL Final    Calcium Level Total 11/24/2023 9.3  8.4 - 10.2 mg/dL Final    Protein Total 11/24/2023 6.8  5.8 - 7.6 gm/dL Final    Albumin Level 11/24/2023 4.2  3.4 - 4.8 g/dL Final    Globulin 11/24/2023 2.6  2.4 - 3.5 gm/dL Final    Albumin/Globulin Ratio 11/24/2023 1.6  1.1 - 2.0 ratio Final    Bilirubin Total 11/24/2023 0.7  <=1.5 mg/dL Final    Alkaline Phosphatase 11/24/2023 40  40 - 150 unit/L Final    Alanine Aminotransferase 11/24/2023 14  0 - 55 unit/L Final    Aspartate Aminotransferase 11/24/2023 17  5 - 34 unit/L Final    eGFR 11/24/2023 >60  mls/min/1.73/m2 Final    WBC 11/24/2023 4.45 (L)  4.50 - 11.50 x10(3)/mcL Final    RBC 11/24/2023 4.53  4.20 - 5.40 x10(6)/mcL Final    Hgb 11/24/2023 13.4  12.0 - 16.0 g/dL Final    Hct 11/24/2023 43.7  37.0 - 47.0 % Final    MCV 11/24/2023 96.5 (H)  80.0 - 94.0 fL Final    MCH 11/24/2023 29.6  27.0 - 31.0 pg Final    MCHC 11/24/2023 30.7 (L)  33.0 - 36.0 g/dL Final    RDW 11/24/2023 12.9  11.5 - 17.0 % Final    Platelet 11/24/2023 229  130 - 400 x10(3)/mcL Final    MPV 11/24/2023 8.8  7.4 - 10.4 fL Final    Neut % 11/24/2023 60.1  % Final    Lymph % 11/24/2023 27.4  % Final    Mono % 11/24/2023 9.4  % Final    Eos % 11/24/2023 2.5  % Final    Basophil % 11/24/2023 0.4  % Final    Lymph # 11/24/2023 1.22  0.6 - 4.6 x10(3)/mcL Final    Neut # 11/24/2023 2.67  2.1 - 9.2 x10(3)/mcL Final    Mono # 11/24/2023 0.42  0.1 - 1.3 x10(3)/mcL Final    Eos # 11/24/2023 0.11  0 - 0.9 x10(3)/mcL Final    Baso # 11/24/2023 0.02  <=0.2 x10(3)/mcL Final     IG# 11/24/2023 0.01  0 - 0.04 x10(3)/mcL Final    IG% 11/24/2023 0.2  % Final       Assessment:       1. Malignant neoplasm of upper-outer quadrant of left breast in female, estrogen receptor positive    2. Menopause              Plan:    Patient with Stage IA breast cancer ([id number]). S/p lumpectomy on 3/21/22. Tumor measured 9mm, Grade 1, invasive mucinous carcinoma, margins clear with 4 negative SLNs, strongly ER and UT positive and Her2 negative, with low Ki67.  Due to favorable histology, per NCCN, no chemotherapy recommended.  Treatment recommended with AI X 5 years.  Patient completed adjuvant radiation on 6/1/22.    Currently patient is without any clinical symptoms or laboratory evidence of recurrence.     Started Femara on 6/15/22. Initially, she had numerous complaints -  nausea, dizziness, diarrhea, hot flashes and fogginess. We were not sure if all her complaints were from the Femara so it was held. Symptoms continued after holding so she was instructed to restart the Femara but she did not.   She was changed to Arimidex in 7/2023 and is tolerating much better.      Recent labs all good. Mild intermittent leukopenia which has been off/on.     Baseline DEXA in February revealed osteopenia. We discussed different options and she agreed to start Prolia. Started November 2022. Next dose due November 2023.   Next DEXA due in 2/2024--ordered today to be done at [facility] with her MMG.    Continue Caltrate + D twice daily.   Left Diagnostic MMG 8/2023 at [facility] benign. Bilateral MMG due in 2/2024 and ordered by Dr. Samano.    Continue surveillance visits every 4 months.     All questions answered at this time.      Dee Moss MD

## 2023-11-27 ENCOUNTER — OFFICE VISIT (OUTPATIENT)
Dept: HEMATOLOGY/ONCOLOGY | Facility: CLINIC | Age: 78
End: 2023-11-27
Payer: MEDICARE

## 2023-11-27 VITALS
HEART RATE: 68 BPM | SYSTOLIC BLOOD PRESSURE: 124 MMHG | BODY MASS INDEX: 23.92 KG/M2 | HEIGHT: 63 IN | OXYGEN SATURATION: 99 % | DIASTOLIC BLOOD PRESSURE: 69 MMHG | TEMPERATURE: 97 F | WEIGHT: 135 LBS | RESPIRATION RATE: 16 BRPM

## 2023-11-27 DIAGNOSIS — Z17.0 MALIGNANT NEOPLASM OF UPPER-OUTER QUADRANT OF LEFT BREAST IN FEMALE, ESTROGEN RECEPTOR POSITIVE: Primary | ICD-10-CM

## 2023-11-27 DIAGNOSIS — Z78.0 MENOPAUSE: ICD-10-CM

## 2023-11-27 DIAGNOSIS — C50.412 MALIGNANT NEOPLASM OF UPPER-OUTER QUADRANT OF LEFT BREAST IN FEMALE, ESTROGEN RECEPTOR POSITIVE: Primary | ICD-10-CM

## 2023-11-27 PROCEDURE — 3288F PR FALLS RISK ASSESSMENT DOCUMENTED: ICD-10-PCS | Mod: CPTII,S$GLB,, | Performed by: INTERNAL MEDICINE

## 2023-11-27 PROCEDURE — 99999 PR PBB SHADOW E&M-EST. PATIENT-LVL V: CPT | Mod: PBBFAC,,, | Performed by: INTERNAL MEDICINE

## 2023-11-27 PROCEDURE — 3078F DIAST BP <80 MM HG: CPT | Mod: CPTII,S$GLB,, | Performed by: INTERNAL MEDICINE

## 2023-11-27 PROCEDURE — 1101F PT FALLS ASSESS-DOCD LE1/YR: CPT | Mod: CPTII,S$GLB,, | Performed by: INTERNAL MEDICINE

## 2023-11-27 PROCEDURE — 1125F AMNT PAIN NOTED PAIN PRSNT: CPT | Mod: CPTII,S$GLB,, | Performed by: INTERNAL MEDICINE

## 2023-11-27 PROCEDURE — 3074F PR MOST RECENT SYSTOLIC BLOOD PRESSURE < 130 MM HG: ICD-10-PCS | Mod: CPTII,S$GLB,, | Performed by: INTERNAL MEDICINE

## 2023-11-27 PROCEDURE — 99214 OFFICE O/P EST MOD 30 MIN: CPT | Mod: S$GLB,,, | Performed by: INTERNAL MEDICINE

## 2023-11-27 PROCEDURE — 3074F SYST BP LT 130 MM HG: CPT | Mod: CPTII,S$GLB,, | Performed by: INTERNAL MEDICINE

## 2023-11-27 PROCEDURE — 1160F PR REVIEW ALL MEDS BY PRESCRIBER/CLIN PHARMACIST DOCUMENTED: ICD-10-PCS | Mod: CPTII,S$GLB,, | Performed by: INTERNAL MEDICINE

## 2023-11-27 PROCEDURE — 99214 PR OFFICE/OUTPT VISIT, EST, LEVL IV, 30-39 MIN: ICD-10-PCS | Mod: S$GLB,,, | Performed by: INTERNAL MEDICINE

## 2023-11-27 PROCEDURE — 99999 PR PBB SHADOW E&M-EST. PATIENT-LVL V: ICD-10-PCS | Mod: PBBFAC,,, | Performed by: INTERNAL MEDICINE

## 2023-11-27 PROCEDURE — 1159F PR MEDICATION LIST DOCUMENTED IN MEDICAL RECORD: ICD-10-PCS | Mod: CPTII,S$GLB,, | Performed by: INTERNAL MEDICINE

## 2023-11-27 PROCEDURE — 1125F PR PAIN SEVERITY QUANTIFIED, PAIN PRESENT: ICD-10-PCS | Mod: CPTII,S$GLB,, | Performed by: INTERNAL MEDICINE

## 2023-11-27 PROCEDURE — 3078F PR MOST RECENT DIASTOLIC BLOOD PRESSURE < 80 MM HG: ICD-10-PCS | Mod: CPTII,S$GLB,, | Performed by: INTERNAL MEDICINE

## 2023-11-27 PROCEDURE — 1160F RVW MEDS BY RX/DR IN RCRD: CPT | Mod: CPTII,S$GLB,, | Performed by: INTERNAL MEDICINE

## 2023-11-27 PROCEDURE — 1159F MED LIST DOCD IN RCRD: CPT | Mod: CPTII,S$GLB,, | Performed by: INTERNAL MEDICINE

## 2023-11-27 PROCEDURE — 1101F PR PT FALLS ASSESS DOC 0-1 FALLS W/OUT INJ PAST YR: ICD-10-PCS | Mod: CPTII,S$GLB,, | Performed by: INTERNAL MEDICINE

## 2023-11-27 PROCEDURE — 3288F FALL RISK ASSESSMENT DOCD: CPT | Mod: CPTII,S$GLB,, | Performed by: INTERNAL MEDICINE

## 2023-11-27 RX ORDER — CITALOPRAM 20 MG/1
TABLET, FILM COATED ORAL
COMMUNITY
Start: 2023-09-15

## 2024-01-11 ENCOUNTER — DOCUMENTATION ONLY (OUTPATIENT)
Dept: SURGICAL ONCOLOGY | Facility: CLINIC | Age: 79
End: 2024-01-11

## 2024-01-11 NOTE — PROGRESS NOTES
Spoke with Anais [facility] at the beginning of the week regarding patients diagnostic bilateral mammo to be done in February.She stated the patient was scheduled for a BMD in February but no mammo. I refaxed order over and will check with them later to see if she ended up getting scheduled. Once scheduled, I will schedule RTC. cpl

## 2024-01-18 ENCOUNTER — TELEPHONE (OUTPATIENT)
Dept: SURGICAL ONCOLOGY | Facility: CLINIC | Age: 79
End: 2024-01-18

## 2024-01-18 ENCOUNTER — DOCUMENTATION ONLY (OUTPATIENT)
Dept: SURGICAL ONCOLOGY | Facility: CLINIC | Age: 79
End: 2024-01-18

## 2024-01-18 NOTE — TELEPHONE ENCOUNTER
Tried contacting Anais at [facility] to see if patient had been scheduled yet for February Mammo that I faxed over on the 11th. No answer. Left  for return call. cpl

## 2024-01-18 NOTE — PROGRESS NOTES
Per Anais at [facility], patient is scheduled for 03/25/2024 at 9:45 for mammo. She is aware that patient needed it for February, however, this is 1st avail as February/early March is booked up. cpl

## 2024-03-26 NOTE — PROGRESS NOTES
Subjective:       Patient ID: Nadja Monae is a 78 y.o. female.    Referring physician: Dr. Brandon Samano  Reason for Referral: Breast Cancer    Left Breast Cancer Stage IA ([id number])--Diagnosed 22  Biopsy/pathology:  Left breast biopsy done 22--2:00 4CMFN core biopsies positive for invasive ductal carcinoma, mucinous type, intermediate grade, measuring at least 0.7cm, DCIS, cribriform pattern, intermediate grade, w/o necrosis, %, AL 87%, Her2 1+ by IHC, negative by FISH, Ki67 5.3%.  Surgery/pathology: Left breast lumpectomy with SLN biopsy done 3/21/22--invasive mucinous carcinoma, well differentiated, Grade 1, measuring 0.9cm, clear margins, 4 negative SLNs.   Imagin. Screening Oscar MMG bilateral done at [facility] 22--an asymmetry anterior UOQ of left breast, measures 1.3cm, needs additional imaging, BIRADS 0.  2. Diagnostic MMG/US done at [facility] 2/15/22--lobulated mass in left breast at 2:00 4CMFN, measures 0.9X0.5X1cm, suspicious by US, focal asymmetry in UOQ of left breast does persist on MMG, BIRADS 4, biopsy recommeded.     [facility] DEXA   22--AP spine T-score 0.4, Total hip left -1.0, right -1.0, Femur left -1.9, right -1.7 c/w osteopoenia bilateral femurs.   24--Spine 0.6, total hip left -0.8, femur neck left -1.8, total right -1.0, femur neck right -1.3. Improvement in osteopenia.     Treatment history:  Left breast lumpectomy and SLN biopsy 3/21/22.  Adjuvant radiation completed 22.    Current treatment plan:  Femara X 5 years. Started on 6/15/22. Stopped ? 2023.   Started on Arimidex on 23.   Prolia every 6 months. Started on 22. Next dose now.     Chief Complaint: Other Misc (Pt reports no concerns today.)    HPI   Patient presents for follow-up of breast cancer. She is doing okay. Continues on Arimidex daily. She is tolerating much better. Also continues on Prolia and is overdue for a dose. She declined the dose due in December after having knee surgery. She  also needs to be seen by her dentist so she would like to hold off on restarting at this time. Recent MMG was benign and DEXA showed improvement in her osteopenia. Mentions her  is having some heart problems and she has been busy taking care of him. No other problems reported.      Past Medical History:   Diagnosis Date    Anxiety       Review of patient's allergies indicates:  No Known Allergies   Current Outpatient Medications on File Prior to Visit   Medication Sig Dispense Refill    allopurinoL (ZYLOPRIM) 300 MG tablet Take 300 mg by mouth once daily.      anastrozole (ARIMIDEX) 1 mg Tab Take 1 tablet (1 mg total) by mouth once daily. 90 tablet 3    citalopram (CELEXA) 20 MG tablet       dapagliflozin (FARXIGA) 10 mg tablet   0 Refill(s)      diclofenac (VOLTAREN) 75 MG EC tablet Take 75 mg by mouth 2 (two) times daily.      dulaglutide (TRULICITY) 1.5 mg/0.5 mL pen injector Inject 1.5 mg into the skin every 7 days.      glimepiride (AMARYL) 4 MG tablet Take 4 mg by mouth once daily.      isosorbide mononitrate (IMDUR) 30 MG 24 hr tablet Take 15 mg by mouth once daily.      metFORMIN (GLUCOPHAGE) 1000 MG tablet Take 1,000 mg by mouth 2 (two) times daily.      ONETOUCH DELICA PLUS LANCET 33 gauge Misc Apply topically.      ONETOUCH ULTRA TEST Strp 2 (two) times a day. Test      ONETOUCH ULTRA2 METER Misc SMARTSIG:Via Meter Daily      pioglitazone (ACTOS) 30 MG tablet Take 15 mg by mouth once daily.      rosuvastatin (CRESTOR) 20 MG tablet Take 5 mg by mouth Daily.      denosumab (PROLIA) 60 mg/mL Syrg Inject 60 mg into the skin.      ofloxacin (OCUFLOX) 0.3 % ophthalmic solution SMARTSIG:In Ear(s)       No current facility-administered medications on file prior to visit.      Review of Systems   Constitutional:  Negative for appetite change and fever.   HENT:  Negative for mouth sores.    Eyes: Negative.    Respiratory:  Negative for cough and shortness of breath.    Cardiovascular:  Negative for chest  pain and leg swelling.   Gastrointestinal:  Negative for abdominal distention, abdominal pain, constipation, diarrhea, vomiting and reflux.   Genitourinary:  Negative for difficulty urinating, dysuria and hematuria.   Musculoskeletal:  Negative for arthralgias and back pain.   Integumentary:  Negative for rash.   Neurological:  Negative for weakness and headaches.   Hematological:  Negative for adenopathy.   Psychiatric/Behavioral:  Negative for sleep disturbance. The patient is not nervous/anxious.          Vitals:    04/03/24 1252   BP: 136/76   Pulse: 66   Resp: 14   Temp: 97.9 °F (36.6 °C)     Physical Exam  Constitutional:       Appearance: Normal appearance. She is normal weight.   HENT:      Head: Normocephalic.      Nose: Nose normal.      Mouth/Throat:      Mouth: Mucous membranes are moist.   Eyes:      Extraocular Movements: Extraocular movements intact.      Conjunctiva/sclera: Conjunctivae normal.   Cardiovascular:      Rate and Rhythm: Normal rate and regular rhythm.      Heart sounds: Normal heart sounds.   Pulmonary:      Effort: Pulmonary effort is normal.      Breath sounds: Normal breath sounds.   Chest:   Breasts:     Right: Normal.      Comments: Left breast with incision outer quadrant, left axillary incision, no palpable mass in either breast and no adenopathy  Abdominal:      General: Abdomen is flat. Bowel sounds are normal. There is no distension.      Palpations: Abdomen is soft.      Tenderness: There is no abdominal tenderness.   Musculoskeletal:         General: Normal range of motion.      Right lower leg: No edema.      Left lower leg: No edema.   Skin:     General: Skin is warm and dry.   Neurological:      General: No focal deficit present.      Mental Status: She is alert and oriented to person, place, and time.   Psychiatric:         Attention and Perception: Attention normal.         Mood and Affect: Mood normal.         Speech: Speech normal.         Behavior: Behavior is  cooperative.         Judgment: Judgment normal.       Lab Visit on 04/03/2024   Component Date Value Ref Range Status    WBC 04/03/2024 4.26 (L)  4.50 - 11.50 x10(3)/mcL Final    RBC 04/03/2024 4.55  4.20 - 5.40 x10(6)/mcL Final    Hgb 04/03/2024 13.5  12.0 - 16.0 g/dL Final    Hct 04/03/2024 41.6  37.0 - 47.0 % Final    MCV 04/03/2024 91.4  80.0 - 94.0 fL Final    MCH 04/03/2024 29.7  27.0 - 31.0 pg Final    MCHC 04/03/2024 32.5 (L)  33.0 - 36.0 g/dL Final    RDW 04/03/2024 12.9  11.5 - 17.0 % Final    Platelet 04/03/2024 233  130 - 400 x10(3)/mcL Final    MPV 04/03/2024 8.9  7.4 - 10.4 fL Final    Neut % 04/03/2024 58.0  % Final    Lymph % 04/03/2024 32.6  % Final    Mono % 04/03/2024 7.3  % Final    Eos % 04/03/2024 1.6  % Final    Basophil % 04/03/2024 0.5  % Final    Lymph # 04/03/2024 1.39  0.6 - 4.6 x10(3)/mcL Final    Neut # 04/03/2024 2.47  2.1 - 9.2 x10(3)/mcL Final    Mono # 04/03/2024 0.31  0.1 - 1.3 x10(3)/mcL Final    Eos # 04/03/2024 0.07  0 - 0.9 x10(3)/mcL Final    Baso # 04/03/2024 0.02  <=0.2 x10(3)/mcL Final    IG# 04/03/2024 0.00  0 - 0.04 x10(3)/mcL Final    IG% 04/03/2024 0.0  % Final       Assessment:       1. Malignant neoplasm of upper-outer quadrant of left breast in female, estrogen receptor positive    2. Osteopenia of necks of both femurs              Plan:    Patient with Stage IA breast cancer ([id number]). S/p lumpectomy on 3/21/22. Tumor measured 9mm, Grade 1, invasive mucinous carcinoma, margins clear with 4 negative SLNs, strongly ER and CA positive and Her2 negative, with low Ki67.  Due to favorable histology, per NCCN, no chemotherapy recommended.  Treatment recommended with AI X 5 years.  Patient completed adjuvant radiation on 6/1/22.    Currently patient is without any clinical symptoms or laboratory evidence of recurrence.   Started Femara on 6/15/22. Initially, she had numerous complaints -  nausea, dizziness, diarrhea, hot flashes and fogginess. We were not sure if all her  complaints were from the Femara so it was held. Symptoms continued after holding so she was instructed to restart the Femara but she did not.   She was changed to Arimidex in 7/2023 and is tolerating much better.   CBC today shows mild intermittent leukopenia which has been off/on. CMP pending.   Bilateral MMG at [facility] on 3/26/24 benign with recommendations to repeat in 1 year.   Continue Caltrate + D twice daily.   She is now > 2 years from diagnosis, we can change visits to every 6 months.   All questions answered at this time.    Baseline DEXA revealed osteopenia. We discussed different options and she agreed to start Prolia. Started November 2022. Missed her dose due in November 2023.   DEXA done at [facility] last week shows improvement. Continue with the Prolia. She needs to be seen by her dentist now and would like to delay the Prolia until after his appointment which is fine.       Zena Arnold, ELBA

## 2024-04-03 ENCOUNTER — LAB VISIT (OUTPATIENT)
Dept: LAB | Facility: HOSPITAL | Age: 79
End: 2024-04-03
Attending: NURSE PRACTITIONER
Payer: MEDICARE

## 2024-04-03 ENCOUNTER — OFFICE VISIT (OUTPATIENT)
Dept: HEMATOLOGY/ONCOLOGY | Facility: CLINIC | Age: 79
End: 2024-04-03
Payer: MEDICARE

## 2024-04-03 VITALS
OXYGEN SATURATION: 97 % | TEMPERATURE: 98 F | HEART RATE: 66 BPM | RESPIRATION RATE: 14 BRPM | DIASTOLIC BLOOD PRESSURE: 76 MMHG | BODY MASS INDEX: 23.62 KG/M2 | SYSTOLIC BLOOD PRESSURE: 136 MMHG | WEIGHT: 133.31 LBS | HEIGHT: 63 IN

## 2024-04-03 DIAGNOSIS — M85.852 OSTEOPENIA OF NECKS OF BOTH FEMURS: ICD-10-CM

## 2024-04-03 DIAGNOSIS — C50.412 MALIGNANT NEOPLASM OF UPPER-OUTER QUADRANT OF LEFT BREAST IN FEMALE, ESTROGEN RECEPTOR POSITIVE: Primary | ICD-10-CM

## 2024-04-03 DIAGNOSIS — Z17.0 MALIGNANT NEOPLASM OF UPPER-OUTER QUADRANT OF LEFT BREAST IN FEMALE, ESTROGEN RECEPTOR POSITIVE: ICD-10-CM

## 2024-04-03 DIAGNOSIS — C50.412 MALIGNANT NEOPLASM OF UPPER-OUTER QUADRANT OF LEFT BREAST IN FEMALE, ESTROGEN RECEPTOR POSITIVE: ICD-10-CM

## 2024-04-03 DIAGNOSIS — Z17.0 MALIGNANT NEOPLASM OF UPPER-OUTER QUADRANT OF LEFT BREAST IN FEMALE, ESTROGEN RECEPTOR POSITIVE: Primary | ICD-10-CM

## 2024-04-03 DIAGNOSIS — M85.851 OSTEOPENIA OF NECKS OF BOTH FEMURS: ICD-10-CM

## 2024-04-03 LAB
ALBUMIN SERPL-MCNC: 4.1 G/DL (ref 3.4–4.8)
ALBUMIN/GLOB SERPL: 1.5 RATIO (ref 1.1–2)
ALP SERPL-CCNC: 47 UNIT/L (ref 40–150)
ALT SERPL-CCNC: 11 UNIT/L (ref 0–55)
AST SERPL-CCNC: 17 UNIT/L (ref 5–34)
BASOPHILS # BLD AUTO: 0.02 X10(3)/MCL
BASOPHILS NFR BLD AUTO: 0.5 %
BILIRUB SERPL-MCNC: 0.5 MG/DL
BUN SERPL-MCNC: 14.4 MG/DL (ref 9.8–20.1)
CALCIUM SERPL-MCNC: 9.6 MG/DL (ref 8.4–10.2)
CHLORIDE SERPL-SCNC: 105 MMOL/L (ref 98–107)
CO2 SERPL-SCNC: 26 MMOL/L (ref 23–31)
CREAT SERPL-MCNC: 0.71 MG/DL (ref 0.55–1.02)
EOSINOPHIL # BLD AUTO: 0.07 X10(3)/MCL (ref 0–0.9)
EOSINOPHIL NFR BLD AUTO: 1.6 %
ERYTHROCYTE [DISTWIDTH] IN BLOOD BY AUTOMATED COUNT: 12.9 % (ref 11.5–17)
GFR SERPLBLD CREATININE-BSD FMLA CKD-EPI: >60 MLS/MIN/1.73/M2
GLOBULIN SER-MCNC: 2.8 GM/DL (ref 2.4–3.5)
GLUCOSE SERPL-MCNC: 135 MG/DL (ref 82–115)
HCT VFR BLD AUTO: 41.6 % (ref 37–47)
HGB BLD-MCNC: 13.5 G/DL (ref 12–16)
IMM GRANULOCYTES # BLD AUTO: 0 X10(3)/MCL (ref 0–0.04)
IMM GRANULOCYTES NFR BLD AUTO: 0 %
LYMPHOCYTES # BLD AUTO: 1.39 X10(3)/MCL (ref 0.6–4.6)
LYMPHOCYTES NFR BLD AUTO: 32.6 %
MCH RBC QN AUTO: 29.7 PG (ref 27–31)
MCHC RBC AUTO-ENTMCNC: 32.5 G/DL (ref 33–36)
MCV RBC AUTO: 91.4 FL (ref 80–94)
MONOCYTES # BLD AUTO: 0.31 X10(3)/MCL (ref 0.1–1.3)
MONOCYTES NFR BLD AUTO: 7.3 %
NEUTROPHILS # BLD AUTO: 2.47 X10(3)/MCL (ref 2.1–9.2)
NEUTROPHILS NFR BLD AUTO: 58 %
PLATELET # BLD AUTO: 233 X10(3)/MCL (ref 130–400)
PMV BLD AUTO: 8.9 FL (ref 7.4–10.4)
POTASSIUM SERPL-SCNC: 3.7 MMOL/L (ref 3.5–5.1)
PROT SERPL-MCNC: 6.9 GM/DL (ref 5.8–7.6)
RBC # BLD AUTO: 4.55 X10(6)/MCL (ref 4.2–5.4)
SODIUM SERPL-SCNC: 140 MMOL/L (ref 136–145)
WBC # SPEC AUTO: 4.26 X10(3)/MCL (ref 4.5–11.5)

## 2024-04-03 PROCEDURE — 1125F AMNT PAIN NOTED PAIN PRSNT: CPT | Mod: CPTII,S$GLB,, | Performed by: NURSE PRACTITIONER

## 2024-04-03 PROCEDURE — 3078F DIAST BP <80 MM HG: CPT | Mod: CPTII,S$GLB,, | Performed by: NURSE PRACTITIONER

## 2024-04-03 PROCEDURE — 3288F FALL RISK ASSESSMENT DOCD: CPT | Mod: CPTII,S$GLB,, | Performed by: NURSE PRACTITIONER

## 2024-04-03 PROCEDURE — 99214 OFFICE O/P EST MOD 30 MIN: CPT | Mod: S$GLB,,, | Performed by: NURSE PRACTITIONER

## 2024-04-03 PROCEDURE — 1159F MED LIST DOCD IN RCRD: CPT | Mod: CPTII,S$GLB,, | Performed by: NURSE PRACTITIONER

## 2024-04-03 PROCEDURE — 99999 PR PBB SHADOW E&M-EST. PATIENT-LVL V: CPT | Mod: PBBFAC,,, | Performed by: NURSE PRACTITIONER

## 2024-04-03 PROCEDURE — 1160F RVW MEDS BY RX/DR IN RCRD: CPT | Mod: CPTII,S$GLB,, | Performed by: NURSE PRACTITIONER

## 2024-04-03 PROCEDURE — 80053 COMPREHEN METABOLIC PANEL: CPT

## 2024-04-03 PROCEDURE — 36415 COLL VENOUS BLD VENIPUNCTURE: CPT

## 2024-04-03 PROCEDURE — 85025 COMPLETE CBC W/AUTO DIFF WBC: CPT

## 2024-04-03 PROCEDURE — 1101F PT FALLS ASSESS-DOCD LE1/YR: CPT | Mod: CPTII,S$GLB,, | Performed by: NURSE PRACTITIONER

## 2024-04-03 PROCEDURE — 3075F SYST BP GE 130 - 139MM HG: CPT | Mod: CPTII,S$GLB,, | Performed by: NURSE PRACTITIONER

## 2024-04-03 RX ORDER — ANASTROZOLE 1 MG/1
1 TABLET ORAL DAILY
Qty: 90 TABLET | Refills: 3 | Status: SHIPPED | OUTPATIENT
Start: 2024-04-03 | End: 2025-04-03

## 2024-04-23 ENCOUNTER — OFFICE VISIT (OUTPATIENT)
Dept: SURGICAL ONCOLOGY | Facility: CLINIC | Age: 79
End: 2024-04-23
Payer: MEDICARE

## 2024-04-23 VITALS
DIASTOLIC BLOOD PRESSURE: 70 MMHG | HEART RATE: 70 BPM | HEIGHT: 63 IN | BODY MASS INDEX: 23.28 KG/M2 | SYSTOLIC BLOOD PRESSURE: 120 MMHG | WEIGHT: 131.38 LBS

## 2024-04-23 DIAGNOSIS — Z85.3 HISTORY OF BREAST CANCER: Primary | ICD-10-CM

## 2024-04-23 PROCEDURE — 3078F DIAST BP <80 MM HG: CPT | Mod: CPTII,S$GLB,, | Performed by: SURGERY

## 2024-04-23 PROCEDURE — 3288F FALL RISK ASSESSMENT DOCD: CPT | Mod: CPTII,S$GLB,, | Performed by: SURGERY

## 2024-04-23 PROCEDURE — 1101F PT FALLS ASSESS-DOCD LE1/YR: CPT | Mod: CPTII,S$GLB,, | Performed by: SURGERY

## 2024-04-23 PROCEDURE — 1159F MED LIST DOCD IN RCRD: CPT | Mod: CPTII,S$GLB,, | Performed by: SURGERY

## 2024-04-23 PROCEDURE — 99999 PR PBB SHADOW E&M-EST. PATIENT-LVL III: CPT | Mod: PBBFAC,,, | Performed by: SURGERY

## 2024-04-23 PROCEDURE — 99214 OFFICE O/P EST MOD 30 MIN: CPT | Mod: S$GLB,,, | Performed by: SURGERY

## 2024-04-23 PROCEDURE — 3074F SYST BP LT 130 MM HG: CPT | Mod: CPTII,S$GLB,, | Performed by: SURGERY

## 2024-04-23 NOTE — PROGRESS NOTES
Chief complaint:  Breast cancer follow-up     HPI: 78 year-old-female referred by Dr. Wendy Siddiqui who underwent routine screening mammogram showing calcifications in both breast and an asymmetry in the outer left breast, 2 o'clock position, 4 cm from the nipple.. Diagnostic imaging showed a left breast lobulated mass measuring 1cm. Biopsy was performed, pathology showing invasive ductal carcinoma. Breast receptor studies are pending. The patient reports remote history of a benign breast biopsy in the past. She reports no family history of breast or ovarian cancer.    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  She completed adjuvant radiation therapy.  She discontinued Femara due to side effects.  I reviewed medical oncology progress notes.    She denies breast masses, skin changes, nipple inversion or discharge on self-breast exam.    Recent surveillance mammography was reviewed, I personally interpreted the images and reviewed the report.  I discussed with the patient in detail and questions were addressed.  There are no suspicious findings.    Greater than 30 minutes was required for complete chart review, imaging review patient consultation and documentation            Past Medical and Surgical History  Allergies :   Patient has no known allergies.    [facility]  Medical :   She has a past medical history of Anxiety.    Surgical :   She has a past surgical history that includes Hysterectomy; Shoulder surgery; Cholecystectomy; Knee surgery; and LUMPECTOMY,BREAST,WITH RADIOACTIVE SEED LOCALIZATION AND SENTINEL LYMPH NODE BIOPSY (Left).     Family History  Her family history includes Cancer in her father; Diabetes in her brother.    Social History  She reports that she has never smoked. She  has never used smokeless tobacco. She reports that she does not currently use alcohol. She reports that she does not use drugs.     Review of Systems   Constitutional:  Negative for appetite change, chills, diaphoresis and fever.   HENT:  Negative for congestion, drooling, ear discharge, ear pain and hearing loss.    Eyes:  Negative for discharge.   Respiratory:  Negative for apnea, cough, choking, chest tightness, shortness of breath and stridor.    Cardiovascular:  Negative for chest pain, palpitations and leg swelling.   Endocrine: Negative for cold intolerance and heat intolerance.   Genitourinary:  Negative for difficulty urinating, dyspareunia, dysuria and hematuria.   Musculoskeletal:  Negative for arthralgias, gait problem and joint swelling.   Skin:  Negative for color change and rash.   Neurological:  Negative for dizziness, tremors, seizures, syncope, facial asymmetry, speech difficulty, light-headedness, numbness and headaches.   Psychiatric/Behavioral:  Negative for agitation and confusion.         Objective   Physical Exam  Vitals and nursing note reviewed.   Constitutional:       General: She is not in acute distress.     Appearance: Normal appearance. She is normal weight. She is not ill-appearing, toxic-appearing or diaphoretic.   HENT:      Head: Normocephalic and atraumatic.      Right Ear: External ear normal.      Left Ear: External ear normal.      Nose: Nose normal.      Mouth/Throat:      Mouth: Mucous membranes are moist.      Pharynx: Oropharynx is clear.   Eyes:      General: No scleral icterus.     Extraocular Movements: Extraocular movements intact.      Conjunctiva/sclera: Conjunctivae normal.      Pupils: Pupils are equal, round, and reactive to light.   Cardiovascular:      Rate and Rhythm: Normal rate and regular rhythm.      Pulses: Normal pulses.      Heart sounds: Normal heart sounds. No murmur heard.     No friction rub. No gallop.   Pulmonary:      Effort: Pulmonary effort is  normal. No respiratory distress.      Breath sounds: Normal breath sounds. No stridor. No wheezing or rhonchi.   Chest:      Chest wall: No tenderness.   Breasts:     Right: No swelling, bleeding, inverted nipple, mass, nipple discharge, skin change or tenderness.      Left: No swelling, bleeding, inverted nipple, mass, nipple discharge, skin change or tenderness.   Abdominal:      General: Abdomen is flat. There is no distension.      Palpations: Abdomen is soft. There is no mass.      Tenderness: There is no abdominal tenderness. There is no right CVA tenderness, left CVA tenderness, guarding or rebound.      Hernia: No hernia is present.   Musculoskeletal:         General: No swelling, tenderness, deformity or signs of injury. Normal range of motion.      Cervical back: Normal range of motion and neck supple. No rigidity or tenderness.      Right lower leg: No edema.      Left lower leg: No edema.   Lymphadenopathy:      Cervical: No cervical adenopathy.      Upper Body:      Right upper body: No supraclavicular or axillary adenopathy.      Left upper body: No supraclavicular or axillary adenopathy.   Skin:     General: Skin is warm.      Capillary Refill: Capillary refill takes less than 2 seconds.      Coloration: Skin is not jaundiced or pale.      Findings: No bruising, erythema, lesion or rash.   Neurological:      General: No focal deficit present.      Mental Status: She is alert and oriented to person, place, and time. Mental status is at baseline.      Cranial Nerves: No cranial nerve deficit.      Sensory: No sensory deficit.      Motor: No weakness.      Coordination: Coordination normal.      Gait: Gait normal.   Psychiatric:         Mood and Affect: Mood normal.         Behavior: Behavior normal.         Thought Content: Thought content normal.         Judgment: Judgment normal.       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

## 2024-07-09 ENCOUNTER — HOSPITAL ENCOUNTER (OUTPATIENT)
Dept: RADIOLOGY | Facility: HOSPITAL | Age: 79
Discharge: HOME | End: 2024-07-09
Attending: FAMILY MEDICINE
Payer: MEDICARE

## 2024-07-09 DIAGNOSIS — M25.512 PAIN IN LEFT ACROMIOCLAVICULAR JOINT: ICD-10-CM

## 2024-07-09 DIAGNOSIS — M25.519 PAIN IN UNSPECIFIED SHOULDER: ICD-10-CM

## 2024-07-09 PROCEDURE — 73000 X-RAY EXAM OF COLLAR BONE: CPT | Mod: TC,LT

## 2024-07-09 PROCEDURE — 73030 X-RAY EXAM OF SHOULDER: CPT | Mod: TC,LT

## 2024-08-06 ENCOUNTER — PATIENT MESSAGE (OUTPATIENT)
Dept: ADMINISTRATIVE | Facility: OTHER | Age: 79
End: 2024-08-06
Payer: MEDICARE

## 2024-11-21 NOTE — PROGRESS NOTES
Subjective:       Patient ID: Nadja Monae is a 78 y.o. female.    Referring physician: Dr. Brandon Samano  Reason for Referral: Breast Cancer    Left Breast Cancer Stage IA ([id number])--Diagnosed 22  Biopsy/pathology:  Left breast biopsy done 22--2:00 4CMFN core biopsies positive for invasive ductal carcinoma, mucinous type, intermediate grade, measuring at least 0.7cm, DCIS, cribriform pattern, intermediate grade, w/o necrosis, %, OR 87%, Her2 1+ by IHC, negative by FISH, Ki67 5.3%.  Surgery/pathology: Left breast lumpectomy with SLN biopsy done 3/21/22--invasive mucinous carcinoma, well differentiated, Grade 1, measuring 0.9cm, clear margins, 4 negative SLNs.   Imagin. Screening Oscar MMG bilateral done at [facility] 22--an asymmetry anterior UOQ of left breast, measures 1.3cm, needs additional imaging, BIRADS 0.  2. Diagnostic MMG/US done at [facility] 2/15/22--lobulated mass in left breast at 2:00 4CMFN, measures 0.9X0.5X1cm, suspicious by US, focal asymmetry in UOQ of left breast does persist on MMG, BIRADS 4, biopsy recommeded.     [facility] DEXA   22--AP spine T-score 0.4, Total hip left -1.0, right -1.0, Femur left -1.9, right -1.7 c/w osteopoenia bilateral femurs.   24--Spine 0.6, total hip left -0.8, femur neck left -1.8, total right -1.0, femur neck right -1.3. Improvement in osteopenia.     Treatment history:  Left breast lumpectomy and SLN biopsy 3/21/22.  Adjuvant radiation completed 22.    Current treatment plan:  Femara X 5 years. Started on 6/15/22. Stopped ? 2023.   Started on Arimidex on 23.   Prolia every 6 months. Started on 22. Will restart in 2025 due to recent dental work.     Chief Complaint: Other Misc (Pt reports L breast and nipple pain last Wednesday and Thursday. )    HPI   Patient presents for follow-up of breast cancer. She is doing okay. Continues on Arimidex daily. She is tolerating much better. Also overdue for Prolia but we had delayed  due to dental work. She completed the dental work early this month so I will restart the Prolia for January 2025. Mentions having some left breast pain last week but it has resolved.  No other problems reported.      Past Medical History:   Diagnosis Date    Anxiety     Breast cancer     DM (diabetes mellitus)     HLD (hyperlipidemia)       Review of patient's allergies indicates:  No Known Allergies   Current Outpatient Medications on File Prior to Visit   Medication Sig Dispense Refill    allopurinoL (ZYLOPRIM) 300 MG tablet Take 300 mg by mouth once daily.      anastrozole (ARIMIDEX) 1 mg Tab Take 1 mg by mouth once daily.      citalopram (CELEXA) 40 MG tablet Take 40 mg by mouth once daily.      dapagliflozin (FARXIGA) 10 mg tablet   0 Refill(s)      dulaglutide (TRULICITY) 0.75 mg/0.5 mL pen injector Inject 0.75 mg into the skin every 7 days. Takes on Mondays.      glimepiride (AMARYL) 4 MG tablet Take 4 mg by mouth before breakfast.      isosorbide mononitrate (IMDUR) 30 MG 24 hr tablet Take 15 mg by mouth once daily.      metFORMIN (GLUCOPHAGE) 1000 MG tablet Take 1,000 mg by mouth 2 (two) times daily with meals.      ONETOUCH DELICA PLUS LANCET 33 gauge Misc Apply topically.      ONETOUCH ULTRA TEST Strp 2 (two) times a day. Test      ONETOUCH ULTRA2 METER Misc SMARTSIG:Via Meter Daily      pioglitazone (ACTOS) 30 MG tablet Take 30 mg by mouth once daily.      rosuvastatin (CRESTOR) 20 MG tablet Take 20 mg by mouth once daily.       No current facility-administered medications on file prior to visit.      Review of Systems   Constitutional:  Negative for appetite change and fever.   HENT:  Negative for mouth sores.    Eyes: Negative.    Respiratory:  Negative for cough and shortness of breath.    Cardiovascular:  Negative for chest pain and leg swelling.   Gastrointestinal:  Negative for abdominal distention, abdominal pain, constipation, diarrhea, vomiting and reflux.   Genitourinary:  Negative for  difficulty urinating, dysuria and hematuria.   Musculoskeletal:  Negative for arthralgias and back pain.   Integumentary:  Negative for rash.   Neurological:  Negative for weakness and headaches.   Hematological:  Negative for adenopathy.   Psychiatric/Behavioral:  Negative for sleep disturbance. The patient is not nervous/anxious.          Vitals:    11/25/24 1409   BP: 132/65   Pulse: 75   Resp: 14   Temp: 97.8 °F (36.6 °C)     Physical Exam  Constitutional:       Appearance: Normal appearance. She is normal weight.   HENT:      Head: Normocephalic.      Nose: Nose normal.      Mouth/Throat:      Mouth: Mucous membranes are moist.   Eyes:      Extraocular Movements: Extraocular movements intact.      Conjunctiva/sclera: Conjunctivae normal.   Cardiovascular:      Rate and Rhythm: Normal rate and regular rhythm.      Heart sounds: Normal heart sounds.   Pulmonary:      Effort: Pulmonary effort is normal.      Breath sounds: Normal breath sounds.   Chest:   Breasts:     Right: Normal.      Comments: Left breast with incision outer quadrant, left axillary incision, intact. Right breast normal. No palpable mass in either breast and no adenopathy  Abdominal:      General: Abdomen is flat. Bowel sounds are normal. There is no distension.      Palpations: Abdomen is soft.      Tenderness: There is no abdominal tenderness.   Musculoskeletal:         General: Normal range of motion.      Right lower leg: No edema.      Left lower leg: No edema.   Skin:     General: Skin is warm and dry.   Neurological:      General: No focal deficit present.      Mental Status: She is alert and oriented to person, place, and time.   Psychiatric:         Attention and Perception: Attention normal.         Mood and Affect: Mood normal.         Speech: Speech normal.         Behavior: Behavior is cooperative.         Judgment: Judgment normal.       Lab Visit on 11/25/2024   Component Date Value Ref Range Status    WBC 11/25/2024 7.41  4.50 -  11.50 x10(3)/mcL Final    RBC 11/25/2024 4.38  4.20 - 5.40 x10(6)/mcL Final    Hgb 11/25/2024 13.4  12.0 - 16.0 g/dL Final    Hct 11/25/2024 40.5  37.0 - 47.0 % Final    MCV 11/25/2024 92.5  80.0 - 94.0 fL Final    MCH 11/25/2024 30.6  27.0 - 31.0 pg Final    MCHC 11/25/2024 33.1  33.0 - 36.0 g/dL Final    RDW 11/25/2024 13.0  11.5 - 17.0 % Final    Platelet 11/25/2024 221  130 - 400 x10(3)/mcL Final    MPV 11/25/2024 8.9  7.4 - 10.4 fL Final    Neut % 11/25/2024 72.3  % Final    Lymph % 11/25/2024 17.3  % Final    Mono % 11/25/2024 8.4  % Final    Eos % 11/25/2024 1.5  % Final    Basophil % 11/25/2024 0.4  % Final    Lymph # 11/25/2024 1.28  0.6 - 4.6 x10(3)/mcL Final    Neut # 11/25/2024 5.36  2.1 - 9.2 x10(3)/mcL Final    Mono # 11/25/2024 0.62  0.1 - 1.3 x10(3)/mcL Final    Eos # 11/25/2024 0.11  0 - 0.9 x10(3)/mcL Final    Baso # 11/25/2024 0.03  <=0.2 x10(3)/mcL Final    IG# 11/25/2024 0.01  0 - 0.04 x10(3)/mcL Final    IG% 11/25/2024 0.1  % Final       Assessment:       1. Malignant neoplasm of upper-outer quadrant of left breast in female, estrogen receptor positive    2. Osteopenia of necks of both femurs    3. Encounter for screening mammogram for breast cancer          Plan:    Patient with Stage IA breast cancer ([id number]). S/p lumpectomy on 3/21/22. Tumor measured 9mm, Grade 1, invasive mucinous carcinoma, margins clear with 4 negative SLNs, strongly ER and [place] positive and Her2 negative, with low Ki67.  Due to favorable histology, per NCCN, no chemotherapy recommended.  Treatment recommended with AI X 5 years.  Patient completed adjuvant radiation on 6/1/22.    Currently patient is without any clinical symptoms or laboratory evidence of recurrence.   Started Femara on 6/15/22. Initially, she had numerous complaints -  nausea, dizziness, diarrhea, hot flashes and fogginess. We were not sure if all her complaints were from the Femara so it was held. Symptoms continued after holding so she was instructed to  restart the Femara but she did not.   She was changed to Arimidex in 7/2023 and is tolerating much better.   CBC today good. CMP pending.   Bilateral MMG at [facility] on 3/26/24 benign with recommendations to repeat in 1 year. Will send orders for March 2025.   Continue Caltrate + D twice daily.   She is now > 2 years from diagnosis, we can change visits to every 6 months.   All questions answered at this time.    Baseline DEXA revealed osteopenia. We discussed different options and she agreed to start Prolia. Started November 2022. Missed her dose due in November 2023.   DEXA done at [facility] in March 2024 shows improvement. Continue with the Prolia. She was due for Prolia in June 2024 but needed dental work so it was held. Completed dental work earlier this month. Will reschedule Prolia for January 2025.      GUSTAVO Abel

## 2024-11-25 ENCOUNTER — OFFICE VISIT (OUTPATIENT)
Dept: HEMATOLOGY/ONCOLOGY | Facility: CLINIC | Age: 79
End: 2024-11-25
Payer: MEDICARE

## 2024-11-25 ENCOUNTER — LAB VISIT (OUTPATIENT)
Dept: LAB | Facility: HOSPITAL | Age: 79
End: 2024-11-25
Attending: INTERNAL MEDICINE
Payer: MEDICARE

## 2024-11-25 VITALS
OXYGEN SATURATION: 99 % | RESPIRATION RATE: 14 BRPM | HEIGHT: 63 IN | WEIGHT: 136.31 LBS | HEART RATE: 75 BPM | SYSTOLIC BLOOD PRESSURE: 132 MMHG | DIASTOLIC BLOOD PRESSURE: 65 MMHG | TEMPERATURE: 98 F | BODY MASS INDEX: 24.15 KG/M2

## 2024-11-25 DIAGNOSIS — M85.852 OSTEOPENIA OF NECKS OF BOTH FEMURS: ICD-10-CM

## 2024-11-25 DIAGNOSIS — Z17.0 MALIGNANT NEOPLASM OF UPPER-OUTER QUADRANT OF LEFT BREAST IN FEMALE, ESTROGEN RECEPTOR POSITIVE: Primary | ICD-10-CM

## 2024-11-25 DIAGNOSIS — Z17.0 MALIGNANT NEOPLASM OF UPPER-OUTER QUADRANT OF LEFT BREAST IN FEMALE, ESTROGEN RECEPTOR POSITIVE: ICD-10-CM

## 2024-11-25 DIAGNOSIS — Z12.31 ENCOUNTER FOR SCREENING MAMMOGRAM FOR BREAST CANCER: ICD-10-CM

## 2024-11-25 DIAGNOSIS — M85.851 OSTEOPENIA OF NECKS OF BOTH FEMURS: ICD-10-CM

## 2024-11-25 DIAGNOSIS — C50.412 MALIGNANT NEOPLASM OF UPPER-OUTER QUADRANT OF LEFT BREAST IN FEMALE, ESTROGEN RECEPTOR POSITIVE: Primary | ICD-10-CM

## 2024-11-25 DIAGNOSIS — C50.412 MALIGNANT NEOPLASM OF UPPER-OUTER QUADRANT OF LEFT BREAST IN FEMALE, ESTROGEN RECEPTOR POSITIVE: ICD-10-CM

## 2024-11-25 PROBLEM — R42 DIZZINESS: Status: ACTIVE | Noted: 2024-11-25

## 2024-11-25 PROBLEM — M19.90 DEGENERATIVE JOINT DISEASE: Status: ACTIVE | Noted: 2024-11-25

## 2024-11-25 LAB
ALBUMIN SERPL-MCNC: 4.1 G/DL (ref 3.4–4.8)
ALBUMIN/GLOB SERPL: 1.5 RATIO (ref 1.1–2)
ALP SERPL-CCNC: 58 UNIT/L (ref 40–150)
ALT SERPL-CCNC: 14 UNIT/L (ref 0–55)
ANION GAP SERPL CALC-SCNC: 13 MEQ/L
AST SERPL-CCNC: 17 UNIT/L (ref 5–34)
BASOPHILS # BLD AUTO: 0.03 X10(3)/MCL
BASOPHILS NFR BLD AUTO: 0.4 %
BILIRUB SERPL-MCNC: 0.6 MG/DL
BUN SERPL-MCNC: 17.1 MG/DL (ref 9.8–20.1)
CALCIUM SERPL-MCNC: 9.7 MG/DL (ref 8.4–10.2)
CHLORIDE SERPL-SCNC: 106 MMOL/L (ref 98–107)
CO2 SERPL-SCNC: 25 MMOL/L (ref 23–31)
CREAT SERPL-MCNC: 0.7 MG/DL (ref 0.55–1.02)
CREAT/UREA NIT SERPL: 24
EOSINOPHIL # BLD AUTO: 0.11 X10(3)/MCL (ref 0–0.9)
EOSINOPHIL NFR BLD AUTO: 1.5 %
ERYTHROCYTE [DISTWIDTH] IN BLOOD BY AUTOMATED COUNT: 13 % (ref 11.5–17)
GFR SERPLBLD CREATININE-BSD FMLA CKD-EPI: >60 ML/MIN/1.73/M2
GLOBULIN SER-MCNC: 2.7 GM/DL (ref 2.4–3.5)
GLUCOSE SERPL-MCNC: 133 MG/DL (ref 82–115)
HCT VFR BLD AUTO: 40.5 % (ref 37–47)
HGB BLD-MCNC: 13.4 G/DL (ref 12–16)
IMM GRANULOCYTES # BLD AUTO: 0.01 X10(3)/MCL (ref 0–0.04)
IMM GRANULOCYTES NFR BLD AUTO: 0.1 %
LYMPHOCYTES # BLD AUTO: 1.28 X10(3)/MCL (ref 0.6–4.6)
LYMPHOCYTES NFR BLD AUTO: 17.3 %
MCH RBC QN AUTO: 30.6 PG (ref 27–31)
MCHC RBC AUTO-ENTMCNC: 33.1 G/DL (ref 33–36)
MCV RBC AUTO: 92.5 FL (ref 80–94)
MONOCYTES # BLD AUTO: 0.62 X10(3)/MCL (ref 0.1–1.3)
MONOCYTES NFR BLD AUTO: 8.4 %
NEUTROPHILS # BLD AUTO: 5.36 X10(3)/MCL (ref 2.1–9.2)
NEUTROPHILS NFR BLD AUTO: 72.3 %
PLATELET # BLD AUTO: 221 X10(3)/MCL (ref 130–400)
PMV BLD AUTO: 8.9 FL (ref 7.4–10.4)
POTASSIUM SERPL-SCNC: 3.4 MMOL/L (ref 3.5–5.1)
PROT SERPL-MCNC: 6.8 GM/DL (ref 5.8–7.6)
RBC # BLD AUTO: 4.38 X10(6)/MCL (ref 4.2–5.4)
SODIUM SERPL-SCNC: 144 MMOL/L (ref 136–145)
WBC # BLD AUTO: 7.41 X10(3)/MCL (ref 4.5–11.5)

## 2024-11-25 PROCEDURE — 99999 PR PBB SHADOW E&M-EST. PATIENT-LVL III: CPT | Mod: PBBFAC,,, | Performed by: NURSE PRACTITIONER

## 2024-11-25 PROCEDURE — 1159F MED LIST DOCD IN RCRD: CPT | Mod: CPTII,S$GLB,, | Performed by: NURSE PRACTITIONER

## 2024-11-25 PROCEDURE — 1101F PT FALLS ASSESS-DOCD LE1/YR: CPT | Mod: CPTII,S$GLB,, | Performed by: NURSE PRACTITIONER

## 2024-11-25 PROCEDURE — 3288F FALL RISK ASSESSMENT DOCD: CPT | Mod: CPTII,S$GLB,, | Performed by: NURSE PRACTITIONER

## 2024-11-25 PROCEDURE — 1160F RVW MEDS BY RX/DR IN RCRD: CPT | Mod: CPTII,S$GLB,, | Performed by: NURSE PRACTITIONER

## 2024-11-25 PROCEDURE — 3075F SYST BP GE 130 - 139MM HG: CPT | Mod: CPTII,S$GLB,, | Performed by: NURSE PRACTITIONER

## 2024-11-25 PROCEDURE — 99213 OFFICE O/P EST LOW 20 MIN: CPT | Mod: S$GLB,,, | Performed by: NURSE PRACTITIONER

## 2024-11-25 PROCEDURE — 1126F AMNT PAIN NOTED NONE PRSNT: CPT | Mod: CPTII,S$GLB,, | Performed by: NURSE PRACTITIONER

## 2024-11-25 PROCEDURE — 3078F DIAST BP <80 MM HG: CPT | Mod: CPTII,S$GLB,, | Performed by: NURSE PRACTITIONER

## 2024-11-25 PROCEDURE — 36415 COLL VENOUS BLD VENIPUNCTURE: CPT

## 2024-11-25 PROCEDURE — 85025 COMPLETE CBC W/AUTO DIFF WBC: CPT

## 2024-11-25 PROCEDURE — 80053 COMPREHEN METABOLIC PANEL: CPT

## 2025-01-07 ENCOUNTER — INFUSION (OUTPATIENT)
Dept: INFUSION THERAPY | Facility: HOSPITAL | Age: 80
End: 2025-01-07
Attending: INTERNAL MEDICINE
Payer: MEDICARE

## 2025-01-07 VITALS
HEART RATE: 68 BPM | SYSTOLIC BLOOD PRESSURE: 132 MMHG | RESPIRATION RATE: 16 BRPM | DIASTOLIC BLOOD PRESSURE: 72 MMHG | OXYGEN SATURATION: 97 %

## 2025-01-07 DIAGNOSIS — M85.852 OSTEOPENIA OF NECKS OF BOTH FEMURS: Primary | ICD-10-CM

## 2025-01-07 DIAGNOSIS — M85.851 OSTEOPENIA OF NECKS OF BOTH FEMURS: Primary | ICD-10-CM

## 2025-01-07 PROCEDURE — 63600175 PHARM REV CODE 636 W HCPCS: Mod: JZ,TB | Performed by: NURSE PRACTITIONER

## 2025-01-07 PROCEDURE — 96372 THER/PROPH/DIAG INJ SC/IM: CPT

## 2025-01-07 RX ADMIN — DENOSUMAB 60 MG: 60 INJECTION SUBCUTANEOUS at 01:01

## 2025-04-14 ENCOUNTER — TELEPHONE (OUTPATIENT)
Dept: HEMATOLOGY/ONCOLOGY | Facility: CLINIC | Age: 80
End: 2025-04-14
Payer: MEDICARE

## 2025-04-14 ENCOUNTER — LAB VISIT (OUTPATIENT)
Dept: LAB | Facility: HOSPITAL | Age: 80
End: 2025-04-14
Attending: FAMILY MEDICINE
Payer: MEDICARE

## 2025-04-14 DIAGNOSIS — R06.00 DYSPNEA, UNSPECIFIED TYPE: Primary | ICD-10-CM

## 2025-04-14 LAB
ANION GAP SERPL CALC-SCNC: 12 MEQ/L
BACTERIA #/AREA URNS AUTO: ABNORMAL /HPF
BASOPHILS # BLD AUTO: 0.03 X10(3)/MCL
BASOPHILS NFR BLD AUTO: 0.5 %
BILIRUB UR QL STRIP.AUTO: NEGATIVE
BUN SERPL-MCNC: 15 MG/DL (ref 9.8–20.1)
CALCIUM SERPL-MCNC: 9.7 MG/DL (ref 8.4–10.2)
CHLORIDE SERPL-SCNC: 105 MMOL/L (ref 98–107)
CK SERPL-CCNC: 77 U/L (ref 29–168)
CLARITY UR: CLEAR
CO2 SERPL-SCNC: 24 MMOL/L (ref 23–31)
COLOR UR AUTO: YELLOW
CREAT SERPL-MCNC: 0.67 MG/DL (ref 0.55–1.02)
CREAT/UREA NIT SERPL: 22
EOSINOPHIL # BLD AUTO: 0.15 X10(3)/MCL (ref 0–0.9)
EOSINOPHIL NFR BLD AUTO: 2.6 %
ERYTHROCYTE [DISTWIDTH] IN BLOOD BY AUTOMATED COUNT: 12.6 % (ref 11.5–17)
GFR SERPLBLD CREATININE-BSD FMLA CKD-EPI: >60 ML/MIN/1.73/M2
GLUCOSE SERPL-MCNC: 109 MG/DL (ref 82–115)
GLUCOSE UR QL STRIP: ABNORMAL
HCT VFR BLD AUTO: 41.4 % (ref 37–47)
HGB BLD-MCNC: 13.8 G/DL (ref 12–16)
HGB UR QL STRIP: ABNORMAL
IMM GRANULOCYTES # BLD AUTO: 0.01 X10(3)/MCL (ref 0–0.04)
IMM GRANULOCYTES NFR BLD AUTO: 0.2 %
KETONES UR QL STRIP: ABNORMAL
LEUKOCYTE ESTERASE UR QL STRIP: NEGATIVE
LYMPHOCYTES # BLD AUTO: 1.79 X10(3)/MCL (ref 0.6–4.6)
LYMPHOCYTES NFR BLD AUTO: 31.2 %
MCH RBC QN AUTO: 29.9 PG (ref 27–31)
MCHC RBC AUTO-ENTMCNC: 33.3 G/DL (ref 33–36)
MCV RBC AUTO: 89.6 FL (ref 80–94)
MONOCYTES # BLD AUTO: 0.45 X10(3)/MCL (ref 0.1–1.3)
MONOCYTES NFR BLD AUTO: 7.8 %
MUCOUS THREADS URNS QL MICRO: ABNORMAL /LPF
NEUTROPHILS # BLD AUTO: 3.31 X10(3)/MCL (ref 2.1–9.2)
NEUTROPHILS NFR BLD AUTO: 57.7 %
NITRITE UR QL STRIP: NEGATIVE
NRBC BLD AUTO-RTO: 0 %
PH UR STRIP: 5 [PH]
PLATELET # BLD AUTO: 257 X10(3)/MCL (ref 130–400)
PMV BLD AUTO: 9.2 FL (ref 7.4–10.4)
POTASSIUM SERPL-SCNC: 3.5 MMOL/L (ref 3.5–5.1)
PROT UR QL STRIP: NEGATIVE
RBC # BLD AUTO: 4.62 X10(6)/MCL (ref 4.2–5.4)
RBC #/AREA URNS AUTO: ABNORMAL /HPF
SODIUM SERPL-SCNC: 141 MMOL/L (ref 136–145)
SP GR UR STRIP.AUTO: 1.01 (ref 1–1.03)
SQUAMOUS #/AREA URNS AUTO: ABNORMAL /HPF
TROPONIN I SERPL-MCNC: <0.01 NG/ML (ref 0–0.04)
TSH SERPL-ACNC: 2.95 UIU/ML (ref 0.35–4.94)
UROBILINOGEN UR STRIP-ACNC: 0.2
WBC # BLD AUTO: 5.74 X10(3)/MCL (ref 4.5–11.5)
WBC #/AREA URNS AUTO: ABNORMAL /HPF

## 2025-04-14 PROCEDURE — 82550 ASSAY OF CK (CPK): CPT

## 2025-04-14 PROCEDURE — 81003 URINALYSIS AUTO W/O SCOPE: CPT

## 2025-04-14 PROCEDURE — 85025 COMPLETE CBC W/AUTO DIFF WBC: CPT

## 2025-04-14 PROCEDURE — 84484 ASSAY OF TROPONIN QUANT: CPT

## 2025-04-14 PROCEDURE — 80048 BASIC METABOLIC PNL TOTAL CA: CPT

## 2025-04-14 PROCEDURE — 36415 COLL VENOUS BLD VENIPUNCTURE: CPT

## 2025-04-14 PROCEDURE — 84443 ASSAY THYROID STIM HORMONE: CPT

## 2025-04-14 NOTE — TELEPHONE ENCOUNTER
Patient left message regarding scheduling mammogram at [facility]. Voice message forwarded to radiology scheduling department.

## 2025-04-24 ENCOUNTER — TELEPHONE (OUTPATIENT)
Dept: HEMATOLOGY/ONCOLOGY | Facility: CLINIC | Age: 80
End: 2025-04-24
Payer: MEDICARE

## 2025-04-24 NOTE — TELEPHONE ENCOUNTER
Pt called requesting orders for MMG at [facility] on [address]. Order in chart looks like its from Dr Brandon Samano. Do we need to send or do they?

## 2025-04-24 NOTE — TELEPHONE ENCOUNTER
I called pt to notify her that I faxed orders for her MMG to [facility]. She voiced understanding and appreciation.

## 2025-06-02 ENCOUNTER — LAB VISIT (OUTPATIENT)
Dept: LAB | Facility: HOSPITAL | Age: 80
End: 2025-06-02
Attending: NURSE PRACTITIONER
Payer: MEDICARE

## 2025-06-02 ENCOUNTER — OFFICE VISIT (OUTPATIENT)
Dept: HEMATOLOGY/ONCOLOGY | Facility: CLINIC | Age: 80
End: 2025-06-02
Payer: MEDICARE

## 2025-06-02 VITALS
WEIGHT: 129.13 LBS | BODY MASS INDEX: 22.88 KG/M2 | RESPIRATION RATE: 14 BRPM | OXYGEN SATURATION: 95 % | HEART RATE: 77 BPM | SYSTOLIC BLOOD PRESSURE: 138 MMHG | DIASTOLIC BLOOD PRESSURE: 82 MMHG | TEMPERATURE: 98 F | HEIGHT: 63 IN

## 2025-06-02 DIAGNOSIS — M85.851 OSTEOPENIA OF NECKS OF BOTH FEMURS: ICD-10-CM

## 2025-06-02 DIAGNOSIS — M85.852 OSTEOPENIA OF NECKS OF BOTH FEMURS: ICD-10-CM

## 2025-06-02 DIAGNOSIS — Z17.0 MALIGNANT NEOPLASM OF UPPER-OUTER QUADRANT OF LEFT BREAST IN FEMALE, ESTROGEN RECEPTOR POSITIVE: ICD-10-CM

## 2025-06-02 DIAGNOSIS — Z17.0 MALIGNANT NEOPLASM OF UPPER-OUTER QUADRANT OF LEFT BREAST IN FEMALE, ESTROGEN RECEPTOR POSITIVE: Primary | ICD-10-CM

## 2025-06-02 DIAGNOSIS — C50.412 MALIGNANT NEOPLASM OF UPPER-OUTER QUADRANT OF LEFT BREAST IN FEMALE, ESTROGEN RECEPTOR POSITIVE: ICD-10-CM

## 2025-06-02 DIAGNOSIS — C50.412 MALIGNANT NEOPLASM OF UPPER-OUTER QUADRANT OF LEFT BREAST IN FEMALE, ESTROGEN RECEPTOR POSITIVE: Primary | ICD-10-CM

## 2025-06-02 LAB
ALBUMIN SERPL-MCNC: 3.9 G/DL (ref 3.4–4.8)
ALBUMIN/GLOB SERPL: 1.1 RATIO (ref 1.1–2)
ALP SERPL-CCNC: 50 UNIT/L (ref 40–150)
ALT SERPL-CCNC: 18 UNIT/L (ref 0–55)
ANION GAP SERPL CALC-SCNC: 10 MEQ/L
AST SERPL-CCNC: 18 UNIT/L (ref 11–45)
BASOPHILS # BLD AUTO: 0.01 X10(3)/MCL
BASOPHILS NFR BLD AUTO: 0.2 %
BILIRUB SERPL-MCNC: 1 MG/DL
BUN SERPL-MCNC: 14 MG/DL (ref 9.8–20.1)
CALCIUM SERPL-MCNC: 9.2 MG/DL (ref 8.4–10.2)
CHLORIDE SERPL-SCNC: 105 MMOL/L (ref 98–107)
CO2 SERPL-SCNC: 23 MMOL/L (ref 23–31)
CREAT SERPL-MCNC: 0.66 MG/DL (ref 0.55–1.02)
CREAT/UREA NIT SERPL: 21
EOSINOPHIL # BLD AUTO: 0.1 X10(3)/MCL (ref 0–0.9)
EOSINOPHIL NFR BLD AUTO: 2.2 %
ERYTHROCYTE [DISTWIDTH] IN BLOOD BY AUTOMATED COUNT: 12.3 % (ref 11.5–17)
GFR SERPLBLD CREATININE-BSD FMLA CKD-EPI: >60 ML/MIN/1.73/M2
GLOBULIN SER-MCNC: 3.5 GM/DL (ref 2.4–3.5)
GLUCOSE SERPL-MCNC: 120 MG/DL (ref 82–115)
HCT VFR BLD AUTO: 40.1 % (ref 37–47)
HGB BLD-MCNC: 13.4 G/DL (ref 12–16)
IMM GRANULOCYTES # BLD AUTO: 0.01 X10(3)/MCL (ref 0–0.04)
IMM GRANULOCYTES NFR BLD AUTO: 0.2 %
LYMPHOCYTES # BLD AUTO: 1.15 X10(3)/MCL (ref 0.6–4.6)
LYMPHOCYTES NFR BLD AUTO: 25.2 %
MCH RBC QN AUTO: 30.5 PG (ref 27–31)
MCHC RBC AUTO-ENTMCNC: 33.4 G/DL (ref 33–36)
MCV RBC AUTO: 91.3 FL (ref 80–94)
MONOCYTES # BLD AUTO: 0.38 X10(3)/MCL (ref 0.1–1.3)
MONOCYTES NFR BLD AUTO: 8.3 %
NEUTROPHILS # BLD AUTO: 2.92 X10(3)/MCL (ref 2.1–9.2)
NEUTROPHILS NFR BLD AUTO: 63.9 %
PLATELET # BLD AUTO: 228 X10(3)/MCL (ref 130–400)
PMV BLD AUTO: 8.6 FL (ref 7.4–10.4)
POTASSIUM SERPL-SCNC: 3.7 MMOL/L (ref 3.5–5.1)
PROT SERPL-MCNC: 7.4 GM/DL (ref 5.8–7.6)
RBC # BLD AUTO: 4.39 X10(6)/MCL (ref 4.2–5.4)
SODIUM SERPL-SCNC: 138 MMOL/L (ref 136–145)
WBC # BLD AUTO: 4.57 X10(3)/MCL (ref 4.5–11.5)

## 2025-06-02 PROCEDURE — 36415 COLL VENOUS BLD VENIPUNCTURE: CPT

## 2025-06-02 PROCEDURE — 85025 COMPLETE CBC W/AUTO DIFF WBC: CPT

## 2025-06-02 PROCEDURE — 80053 COMPREHEN METABOLIC PANEL: CPT

## 2025-06-02 PROCEDURE — 99999 PR PBB SHADOW E&M-EST. PATIENT-LVL IV: CPT | Mod: PBBFAC,,, | Performed by: NURSE PRACTITIONER

## 2025-06-02 RX ORDER — TIZANIDINE 4 MG/1
2-4 TABLET ORAL EVERY 8 HOURS PRN
COMMUNITY
Start: 2025-05-31

## 2025-06-02 RX ORDER — BUSPIRONE HYDROCHLORIDE 5 MG/1
5 TABLET ORAL 2 TIMES DAILY
COMMUNITY
Start: 2025-05-12

## 2025-06-02 RX ORDER — MELOXICAM 15 MG/1
15 TABLET ORAL DAILY
COMMUNITY
Start: 2025-05-31

## 2025-06-02 RX ORDER — CITALOPRAM 20 MG/1
20 TABLET ORAL DAILY
COMMUNITY
Start: 2025-03-17

## 2025-06-02 RX ORDER — ANASTROZOLE 1 MG/1
1 TABLET ORAL DAILY
Qty: 90 TABLET | Refills: 3 | Status: SHIPPED | OUTPATIENT
Start: 2025-06-02